# Patient Record
Sex: MALE | NOT HISPANIC OR LATINO | Employment: FULL TIME | URBAN - METROPOLITAN AREA
[De-identification: names, ages, dates, MRNs, and addresses within clinical notes are randomized per-mention and may not be internally consistent; named-entity substitution may affect disease eponyms.]

---

## 2018-12-03 ENCOUNTER — HOSPITAL ENCOUNTER (OUTPATIENT)
Dept: FAMILY MEDICINE CLINIC | Facility: CLINIC | Age: 48
Setting detail: SPECIMEN
Discharge: HOME | End: 2018-12-03
Attending: PHYSICIAN ASSISTANT | Admitting: PHYSICIAN ASSISTANT

## 2018-12-03 LAB
ALBUMIN SERPL-MCNC: 4.5 G/DL (ref 3.5–4.8)
ALBUMIN/GLOB SERPL: 1.7 {RATIO} (ref 1–1.7)
ALP SERPL-CCNC: 50 IU/L (ref 32–91)
ALT SERPL-CCNC: 32 IU/L (ref 17–63)
ANION GAP SERPL CALC-SCNC: 13.3 MMOL/L (ref 10–20)
AST SERPL-CCNC: 26 IU/L (ref 15–41)
BILIRUB SERPL-MCNC: 1 MG/DL (ref 0.3–1.2)
BUN SERPL-MCNC: 15 MG/DL (ref 8–20)
BUN/CREAT SERPL: 12.5 (ref 6.2–20.3)
CALCIUM SERPL-MCNC: 9.5 MG/DL (ref 8.9–10.3)
CHLORIDE SERPL-SCNC: 103 MMOL/L (ref 101–111)
CHOLEST SERPL-MCNC: 301 MG/DL
CHOLEST/HDLC SERPL: 6.9 {RATIO}
CONV CO2: 23 MMOL/L (ref 22–32)
CONV LDL CHOLESTEROL DIRECT: 239 MG/DL (ref 0–100)
CONV TOTAL PROTEIN: 7.1 G/DL (ref 6.1–7.9)
CREAT UR-MCNC: 1.2 MG/DL (ref 0.7–1.2)
GLOBULIN UR ELPH-MCNC: 2.6 G/DL (ref 2.5–3.8)
GLUCOSE SERPL-MCNC: 101 MG/DL (ref 65–99)
HDLC SERPL-MCNC: 44 MG/DL
LDLC/HDLC SERPL: 5.5 {RATIO}
LIPID INTERPRETATION: ABNORMAL
POTASSIUM SERPL-SCNC: 4.3 MMOL/L (ref 3.6–5.1)
PSA SERPL-MCNC: 0.75 NG/ML (ref 0–4)
SODIUM SERPL-SCNC: 135 MMOL/L (ref 136–144)
TRIGL SERPL-MCNC: 148 MG/DL
VLDLC SERPL CALC-MCNC: 18.8 MG/DL

## 2019-09-03 RX ORDER — ATORVASTATIN CALCIUM 20 MG/1
TABLET, FILM COATED ORAL
Qty: 90 TABLET | Refills: 1 | Status: SHIPPED | OUTPATIENT
Start: 2019-09-03 | End: 2020-03-06 | Stop reason: SDUPTHER

## 2019-10-08 RX ORDER — PANTOPRAZOLE SODIUM 40 MG/1
TABLET, DELAYED RELEASE ORAL
Qty: 90 TABLET | Refills: 3 | Status: SHIPPED | OUTPATIENT
Start: 2019-10-08 | End: 2020-06-04

## 2019-10-08 RX ORDER — FINASTERIDE 1 MG/1
TABLET, FILM COATED ORAL
Qty: 90 TABLET | Refills: 3 | Status: SHIPPED | OUTPATIENT
Start: 2019-10-08 | End: 2020-10-02 | Stop reason: SDUPTHER

## 2019-10-09 RX ORDER — ATENOLOL 25 MG/1
TABLET ORAL
Qty: 90 TABLET | Refills: 1 | Status: SHIPPED | OUTPATIENT
Start: 2019-10-09 | End: 2020-03-04

## 2020-03-04 RX ORDER — ATENOLOL 25 MG/1
TABLET ORAL
Qty: 90 TABLET | Refills: 1 | Status: SHIPPED | OUTPATIENT
Start: 2020-03-04 | End: 2020-08-31

## 2020-03-05 RX ORDER — ATORVASTATIN CALCIUM 20 MG/1
TABLET, FILM COATED ORAL
Qty: 90 TABLET | Refills: 1 | OUTPATIENT
Start: 2020-03-05

## 2020-03-06 RX ORDER — ATORVASTATIN CALCIUM 20 MG/1
20 TABLET, FILM COATED ORAL
Qty: 90 TABLET | Refills: 1 | Status: SHIPPED | OUTPATIENT
Start: 2020-03-06 | End: 2020-08-31

## 2020-06-08 ENCOUNTER — TELEPHONE (OUTPATIENT)
Dept: FAMILY MEDICINE CLINIC | Facility: CLINIC | Age: 50
End: 2020-06-08

## 2020-06-08 NOTE — TELEPHONE ENCOUNTER
I am not sending a 90-day supply because patient is long overdue for an appointment.  If he would like a refill he needs an appointment.

## 2020-06-25 ENCOUNTER — OFFICE VISIT (OUTPATIENT)
Dept: FAMILY MEDICINE CLINIC | Facility: CLINIC | Age: 50
End: 2020-06-25

## 2020-06-25 ENCOUNTER — LAB (OUTPATIENT)
Dept: FAMILY MEDICINE CLINIC | Facility: CLINIC | Age: 50
End: 2020-06-25

## 2020-06-25 VITALS
SYSTOLIC BLOOD PRESSURE: 152 MMHG | WEIGHT: 146 LBS | HEART RATE: 97 BPM | DIASTOLIC BLOOD PRESSURE: 83 MMHG | BODY MASS INDEX: 23.57 KG/M2 | TEMPERATURE: 97.6 F | OXYGEN SATURATION: 100 %

## 2020-06-25 DIAGNOSIS — L64.9 MALE PATTERN BALDNESS: ICD-10-CM

## 2020-06-25 DIAGNOSIS — R53.82 CHRONIC FATIGUE: ICD-10-CM

## 2020-06-25 DIAGNOSIS — I10 ESSENTIAL HYPERTENSION: ICD-10-CM

## 2020-06-25 DIAGNOSIS — R00.2 PALPITATIONS: ICD-10-CM

## 2020-06-25 DIAGNOSIS — K21.9 GASTROESOPHAGEAL REFLUX DISEASE WITHOUT ESOPHAGITIS: ICD-10-CM

## 2020-06-25 DIAGNOSIS — E78.2 MIXED HYPERLIPIDEMIA: ICD-10-CM

## 2020-06-25 DIAGNOSIS — Z11.59 ENCOUNTER FOR HEPATITIS C SCREENING TEST FOR LOW RISK PATIENT: ICD-10-CM

## 2020-06-25 DIAGNOSIS — R00.0 TACHYCARDIA: ICD-10-CM

## 2020-06-25 DIAGNOSIS — F41.9 ANXIETY: ICD-10-CM

## 2020-06-25 DIAGNOSIS — Z00.00 PHYSICAL EXAM: Primary | ICD-10-CM

## 2020-06-25 DIAGNOSIS — E55.9 VITAMIN D DEFICIENCY: ICD-10-CM

## 2020-06-25 PROBLEM — E78.5 HYPERLIPIDEMIA: Status: ACTIVE | Noted: 2018-12-05

## 2020-06-25 PROCEDURE — 80053 COMPREHEN METABOLIC PANEL: CPT | Performed by: PHYSICIAN ASSISTANT

## 2020-06-25 PROCEDURE — 86803 HEPATITIS C AB TEST: CPT | Performed by: PHYSICIAN ASSISTANT

## 2020-06-25 PROCEDURE — 99396 PREV VISIT EST AGE 40-64: CPT | Performed by: PHYSICIAN ASSISTANT

## 2020-06-25 PROCEDURE — 83735 ASSAY OF MAGNESIUM: CPT | Performed by: PHYSICIAN ASSISTANT

## 2020-06-25 PROCEDURE — 80061 LIPID PANEL: CPT | Performed by: PHYSICIAN ASSISTANT

## 2020-06-25 PROCEDURE — 82607 VITAMIN B-12: CPT | Performed by: PHYSICIAN ASSISTANT

## 2020-06-25 PROCEDURE — 82306 VITAMIN D 25 HYDROXY: CPT | Performed by: PHYSICIAN ASSISTANT

## 2020-06-25 PROCEDURE — 36415 COLL VENOUS BLD VENIPUNCTURE: CPT | Performed by: PHYSICIAN ASSISTANT

## 2020-06-25 RX ORDER — PANTOPRAZOLE SODIUM 40 MG/1
40 TABLET, DELAYED RELEASE ORAL EVERY 24 HOURS
COMMUNITY
Start: 2018-09-11 | End: 2020-07-17 | Stop reason: SDUPTHER

## 2020-06-25 RX ORDER — FEXOFENADINE HCL AND PSEUDOEPHEDRINE HCI 180; 240 MG/1; MG/1
TABLET, EXTENDED RELEASE ORAL DAILY PRN
COMMUNITY
Start: 2014-08-19 | End: 2020-06-25

## 2020-06-25 NOTE — PROGRESS NOTES
Melia Aguirre is a 49 y.o. male     History of Present Illness  Patient is a 49-year-old  male here for follow-up and maintenance of his current medical conditions which include:    1.  Hypertension: Patient is currently taking atenolol 25 mg once daily.    2.  GERD: Patient is currently taking pantoprazole 40 mg once daily.    3.  Hyperlipidemia: Patient is currently taking atorvastatin 20 mg once nightly.    4.  Male pattern baldness: Patient is currently taking finasteride 1 mg once daily.    5.  Palpitations/tachycardia: Patient is currently taking atenolol 25 mg once daily.  His cardiologist is Dr. Khalil.    6.  Anxiety: Patient is currently not taking medication for this, he is managing with lifestyle.    7.  Vitamin D deficiency: Patient is currently not taking medication for this, he states he takes over-the-counter medication occasionally.    The following portions of the patient's history were reviewed and updated as appropriate: allergies, current medications, past family history, past medical history, past social history, past surgical history and problem list.    Review of Systems   Constitutional: Negative for fever and unexpected weight loss.   HENT: Negative for ear pain and sore throat.    Eyes: Negative for blurred vision and pain.   Respiratory: Negative for shortness of breath.    Cardiovascular: Negative for chest pain.   Gastrointestinal: Negative for abdominal pain, blood in stool, diarrhea, nausea and vomiting.   Genitourinary: Negative for dysuria.   Musculoskeletal: Negative for joint swelling.   Neurological: Negative for seizures and confusion.   Psychiatric/Behavioral: Negative for suicidal ideas and depressed mood. The patient is not nervous/anxious.        Objective  Physical Exam   Constitutional: He is oriented to person, place, and time. He appears well-developed and well-nourished.   HENT:   Head: Normocephalic and atraumatic.   Right Ear: External ear normal.    Left Ear: External ear normal.   Nose: Nose normal.   Mouth/Throat: Oropharynx is clear and moist.   Eyes: Pupils are equal, round, and reactive to light. Conjunctivae and EOM are normal.   Neck: Normal range of motion. Neck supple.   Cardiovascular: Normal rate, regular rhythm and normal heart sounds.   Pulmonary/Chest: Effort normal and breath sounds normal.   Abdominal: Soft. Bowel sounds are normal.   Musculoskeletal: Normal range of motion.   Neurological: He is alert and oriented to person, place, and time.   Psychiatric: He has a normal mood and affect. His behavior is normal.       Vitals:    06/25/20 1511   BP: 152/83   BP Location: Right arm   Patient Position: Sitting   Cuff Size: Adult   Pulse: 97   Temp: 97.6 °F (36.4 °C)   TempSrc: Oral   SpO2: 100%   Weight: 66.2 kg (146 lb)     Body mass index is 23.57 kg/m².    PHQ-9 Total Score: 0      Assessment/Plan  Diagnoses and all orders for this visit:    1. Physical exam (Primary)    2. Mixed hyperlipidemia  Comments:  Stable, patient to continue current medications.  Orders:  -     Lipid Panel    3. Gastroesophageal reflux disease without esophagitis  Comments:  Stable, patient to continue current medications.    4. Tachycardia  Comments:  Stable, patient to continue current medications.    5. Palpitations  Comments:  Stable, patient to continue current medications.    6. Essential hypertension  Comments:  Stable, patient to continue current medications.  He is to check blood pressures at home and call if consistently elevated.  Orders:  -     Comprehensive Metabolic Panel  -     Magnesium    7. Vitamin D deficiency  Comments:  Vitamin D level today.  Orders:  -     Vitamin D 25 Hydroxy    8. Male pattern baldness  Comments:  Stable, patient to continue current medications.    9. Anxiety  Comments:  Stable, patient is doing well with lifestyle.    10. Encounter for hepatitis C screening test for low risk patient  Comments:  Hepatitis C screening due to  patient's age.  Orders:  -     Hepatitis C Antibody    11. Chronic fatigue  Comments:  Vitamin B12 level and vitamin D levels today at patient request.  Orders:  -     Vitamin B12

## 2020-06-26 LAB
25(OH)D3 SERPL-MCNC: 32.7 NG/ML (ref 30–100)
ALBUMIN SERPL-MCNC: 5.1 G/DL (ref 3.5–5.2)
ALBUMIN/GLOB SERPL: 1.8 G/DL
ALP SERPL-CCNC: 57 U/L (ref 39–117)
ALT SERPL W P-5'-P-CCNC: 21 U/L (ref 1–41)
ANION GAP SERPL CALCULATED.3IONS-SCNC: 12.1 MMOL/L (ref 5–15)
AST SERPL-CCNC: 19 U/L (ref 1–40)
BILIRUB SERPL-MCNC: 0.8 MG/DL (ref 0.2–1.2)
BUN BLD-MCNC: 18 MG/DL (ref 6–20)
BUN/CREAT SERPL: 15 (ref 7–25)
CALCIUM SPEC-SCNC: 10.2 MG/DL (ref 8.6–10.5)
CHLORIDE SERPL-SCNC: 101 MMOL/L (ref 98–107)
CHOLEST SERPL-MCNC: 149 MG/DL (ref 0–200)
CO2 SERPL-SCNC: 25.9 MMOL/L (ref 22–29)
CREAT BLD-MCNC: 1.2 MG/DL (ref 0.76–1.27)
GFR SERPL CREATININE-BSD FRML MDRD: 65 ML/MIN/1.73
GFR SERPL CREATININE-BSD FRML MDRD: 79 ML/MIN/1.73
GLOBULIN UR ELPH-MCNC: 2.8 GM/DL
GLUCOSE BLD-MCNC: 113 MG/DL (ref 65–99)
HCV AB SER DONR QL: NORMAL
HDLC SERPL-MCNC: 47 MG/DL (ref 40–60)
LDLC SERPL CALC-MCNC: 79 MG/DL (ref 0–100)
LDLC/HDLC SERPL: 1.68 {RATIO}
MAGNESIUM SERPL-MCNC: 2.3 MG/DL (ref 1.6–2.6)
POTASSIUM BLD-SCNC: 4.6 MMOL/L (ref 3.5–5.2)
PROT SERPL-MCNC: 7.9 G/DL (ref 6–8.5)
SODIUM BLD-SCNC: 139 MMOL/L (ref 136–145)
TRIGL SERPL-MCNC: 115 MG/DL (ref 0–150)
VIT B12 BLD-MCNC: 990 PG/ML (ref 211–946)
VLDLC SERPL-MCNC: 23 MG/DL (ref 5–40)

## 2020-07-17 RX ORDER — PANTOPRAZOLE SODIUM 40 MG/1
40 TABLET, DELAYED RELEASE ORAL EVERY 24 HOURS
Qty: 90 TABLET | Refills: 3 | Status: SHIPPED | OUTPATIENT
Start: 2020-07-17 | End: 2020-10-01

## 2020-07-21 RX ORDER — PANTOPRAZOLE SODIUM 40 MG/1
TABLET, DELAYED RELEASE ORAL
Qty: 90 TABLET | Refills: 3 | OUTPATIENT
Start: 2020-07-21

## 2020-08-31 DIAGNOSIS — E78.2 MIXED HYPERLIPIDEMIA: Primary | ICD-10-CM

## 2020-08-31 RX ORDER — ATENOLOL 25 MG/1
TABLET ORAL
Qty: 90 TABLET | Refills: 1 | Status: SHIPPED | OUTPATIENT
Start: 2020-08-31 | End: 2021-06-02 | Stop reason: SDUPTHER

## 2020-08-31 RX ORDER — ATORVASTATIN CALCIUM 20 MG/1
20 TABLET, FILM COATED ORAL NIGHTLY
Qty: 90 TABLET | Refills: 1 | Status: SHIPPED | OUTPATIENT
Start: 2020-08-31 | End: 2021-02-25

## 2020-08-31 NOTE — TELEPHONE ENCOUNTER
Lab Results   Component Value Date    CHOL 149 06/25/2020    TRIG 115 06/25/2020    HDL 47 06/25/2020    LDL 79 06/25/2020     The 10-year ASCVD risk score (William ESQUIVEL Jr., et al., 2013) is: 2.7%    Values used to calculate the score:      Age: 49 years      Sex: Male      Is Non- : No      Diabetic: No      Tobacco smoker: No      Systolic Blood Pressure: 152 mmHg      Is BP treated: Yes      HDL Cholesterol: 47 mg/dL      Total Cholesterol: 149 mg/dL

## 2020-10-01 RX ORDER — PANTOPRAZOLE SODIUM 40 MG/1
TABLET, DELAYED RELEASE ORAL
Qty: 90 TABLET | Refills: 0 | Status: SHIPPED | OUTPATIENT
Start: 2020-10-01 | End: 2020-12-23

## 2020-10-04 RX ORDER — FINASTERIDE 1 MG/1
1 TABLET, FILM COATED ORAL DAILY
Qty: 90 TABLET | Refills: 0 | Status: SHIPPED | OUTPATIENT
Start: 2020-10-04 | End: 2020-12-31

## 2020-12-23 RX ORDER — PANTOPRAZOLE SODIUM 40 MG/1
TABLET, DELAYED RELEASE ORAL
Qty: 90 TABLET | Refills: 0 | Status: SHIPPED | OUTPATIENT
Start: 2020-12-23 | End: 2021-02-26

## 2020-12-31 RX ORDER — FINASTERIDE 1 MG/1
TABLET, FILM COATED ORAL
Qty: 90 TABLET | Refills: 0 | Status: SHIPPED | OUTPATIENT
Start: 2020-12-31 | End: 2021-02-26

## 2021-02-25 DIAGNOSIS — E78.2 MIXED HYPERLIPIDEMIA: ICD-10-CM

## 2021-02-25 RX ORDER — ATORVASTATIN CALCIUM 20 MG/1
TABLET, FILM COATED ORAL
Qty: 90 TABLET | Refills: 1 | Status: SHIPPED | OUTPATIENT
Start: 2021-02-25 | End: 2021-08-23

## 2021-02-25 RX ORDER — ATENOLOL 25 MG/1
25 TABLET ORAL DAILY
Qty: 30 TABLET | Refills: 0 | OUTPATIENT
Start: 2021-02-25

## 2021-02-26 RX ORDER — FINASTERIDE 1 MG/1
TABLET, FILM COATED ORAL
Qty: 90 TABLET | Refills: 0 | Status: SHIPPED | OUTPATIENT
Start: 2021-02-26 | End: 2021-05-23

## 2021-02-26 RX ORDER — ATENOLOL 25 MG/1
TABLET ORAL
Qty: 90 TABLET | Refills: 1 | OUTPATIENT
Start: 2021-02-26

## 2021-02-26 RX ORDER — PANTOPRAZOLE SODIUM 40 MG/1
TABLET, DELAYED RELEASE ORAL
Qty: 90 TABLET | Refills: 0 | Status: SHIPPED | OUTPATIENT
Start: 2021-02-26 | End: 2021-05-23

## 2021-04-29 ENCOUNTER — TELEPHONE (OUTPATIENT)
Dept: CARDIOLOGY | Facility: CLINIC | Age: 51
End: 2021-04-29

## 2021-05-11 ENCOUNTER — OFFICE VISIT (OUTPATIENT)
Dept: CARDIOLOGY | Facility: CLINIC | Age: 51
End: 2021-05-11

## 2021-05-11 VITALS
OXYGEN SATURATION: 99 % | HEART RATE: 104 BPM | HEIGHT: 64 IN | DIASTOLIC BLOOD PRESSURE: 90 MMHG | BODY MASS INDEX: 24.41 KG/M2 | WEIGHT: 143 LBS | SYSTOLIC BLOOD PRESSURE: 150 MMHG

## 2021-05-11 DIAGNOSIS — R00.0 TACHYCARDIA: ICD-10-CM

## 2021-05-11 DIAGNOSIS — R00.2 PALPITATIONS: ICD-10-CM

## 2021-05-11 DIAGNOSIS — I10 ESSENTIAL HYPERTENSION: Primary | ICD-10-CM

## 2021-05-11 PROBLEM — E78.2 MIXED HYPERLIPIDEMIA: Status: ACTIVE | Noted: 2018-12-05

## 2021-05-11 PROCEDURE — 99213 OFFICE O/P EST LOW 20 MIN: CPT | Performed by: INTERNAL MEDICINE

## 2021-05-11 PROCEDURE — 93000 ELECTROCARDIOGRAM COMPLETE: CPT | Performed by: INTERNAL MEDICINE

## 2021-05-11 RX ORDER — FEXOFENADINE HYDROCHLORIDE 60 MG/1
60 TABLET, FILM COATED ORAL DAILY
COMMUNITY

## 2021-05-23 RX ORDER — FINASTERIDE 1 MG/1
TABLET, FILM COATED ORAL
Qty: 30 TABLET | Refills: 0 | Status: SHIPPED | OUTPATIENT
Start: 2021-05-23 | End: 2021-07-16

## 2021-05-23 RX ORDER — PANTOPRAZOLE SODIUM 40 MG/1
TABLET, DELAYED RELEASE ORAL
Qty: 30 TABLET | Refills: 0 | Status: SHIPPED | OUTPATIENT
Start: 2021-05-23 | End: 2021-06-22

## 2021-05-23 NOTE — TELEPHONE ENCOUNTER
I refilled his rx but he needs to make an appt and be seen.  It has been a year since he was last seen

## 2021-05-27 ENCOUNTER — TELEPHONE (OUTPATIENT)
Dept: FAMILY MEDICINE CLINIC | Facility: CLINIC | Age: 51
End: 2021-05-27

## 2021-06-02 RX ORDER — ATENOLOL 25 MG/1
25 TABLET ORAL DAILY
Qty: 90 TABLET | Refills: 3 | Status: SHIPPED | OUTPATIENT
Start: 2021-06-02 | End: 2022-05-31 | Stop reason: SDUPTHER

## 2021-06-02 NOTE — TELEPHONE ENCOUNTER
Called patient back and was able to schedule an appointment for Annie 15,2021 at 3:30 with [place] 30 min since it was one of [place] patients

## 2021-06-22 RX ORDER — PANTOPRAZOLE SODIUM 40 MG/1
TABLET, DELAYED RELEASE ORAL
Qty: 30 TABLET | Refills: 0 | Status: SHIPPED | OUTPATIENT
Start: 2021-06-22 | End: 2021-07-16

## 2021-07-16 RX ORDER — PANTOPRAZOLE SODIUM 40 MG/1
TABLET, DELAYED RELEASE ORAL
Qty: 30 TABLET | Refills: 0 | Status: SHIPPED | OUTPATIENT
Start: 2021-07-16 | End: 2021-08-10

## 2021-07-16 RX ORDER — FINASTERIDE 1 MG/1
TABLET, FILM COATED ORAL
Qty: 30 TABLET | Refills: 0 | Status: SHIPPED | OUTPATIENT
Start: 2021-07-16 | End: 2021-08-10

## 2021-07-19 ENCOUNTER — OFFICE VISIT (OUTPATIENT)
Dept: FAMILY MEDICINE CLINIC | Facility: CLINIC | Age: 51
End: 2021-07-19

## 2021-07-19 VITALS
TEMPERATURE: 97.1 F | OXYGEN SATURATION: 100 % | SYSTOLIC BLOOD PRESSURE: 143 MMHG | DIASTOLIC BLOOD PRESSURE: 92 MMHG | WEIGHT: 142 LBS | BODY MASS INDEX: 24.24 KG/M2 | HEART RATE: 108 BPM | HEIGHT: 64 IN

## 2021-07-19 DIAGNOSIS — Z00.00 ROUTINE PHYSICAL EXAMINATION: Primary | ICD-10-CM

## 2021-07-19 DIAGNOSIS — F41.9 ANXIETY: ICD-10-CM

## 2021-07-19 DIAGNOSIS — Z13.220 SCREENING CHOLESTEROL LEVEL: ICD-10-CM

## 2021-07-19 DIAGNOSIS — E55.9 VITAMIN D DEFICIENCY: ICD-10-CM

## 2021-07-19 DIAGNOSIS — R10.11 RIGHT UPPER QUADRANT ABDOMINAL PAIN: ICD-10-CM

## 2021-07-19 DIAGNOSIS — Z13.1 SCREENING FOR DIABETES MELLITUS: ICD-10-CM

## 2021-07-19 PROCEDURE — 99396 PREV VISIT EST AGE 40-64: CPT | Performed by: PHYSICIAN ASSISTANT

## 2021-07-19 NOTE — PATIENT INSTRUCTIONS
Please get your fasting labs done at your convenience.  Let me know if you decide you want to do the colonoscopy or cologuard test for colon cancer screening.

## 2021-08-04 ENCOUNTER — LAB (OUTPATIENT)
Dept: FAMILY MEDICINE CLINIC | Facility: CLINIC | Age: 51
End: 2021-08-04

## 2021-08-04 DIAGNOSIS — Z13.1 SCREENING FOR DIABETES MELLITUS: ICD-10-CM

## 2021-08-04 DIAGNOSIS — Z00.00 ROUTINE PHYSICAL EXAMINATION: ICD-10-CM

## 2021-08-04 DIAGNOSIS — E55.9 VITAMIN D DEFICIENCY: ICD-10-CM

## 2021-08-04 DIAGNOSIS — Z13.220 SCREENING CHOLESTEROL LEVEL: ICD-10-CM

## 2021-08-04 DIAGNOSIS — F41.9 ANXIETY: ICD-10-CM

## 2021-08-04 PROCEDURE — 80053 COMPREHEN METABOLIC PANEL: CPT | Performed by: PHYSICIAN ASSISTANT

## 2021-08-04 PROCEDURE — 82306 VITAMIN D 25 HYDROXY: CPT | Performed by: PHYSICIAN ASSISTANT

## 2021-08-04 PROCEDURE — 80061 LIPID PANEL: CPT | Performed by: PHYSICIAN ASSISTANT

## 2021-08-04 PROCEDURE — 85025 COMPLETE CBC W/AUTO DIFF WBC: CPT | Performed by: PHYSICIAN ASSISTANT

## 2021-08-04 PROCEDURE — 36415 COLL VENOUS BLD VENIPUNCTURE: CPT

## 2021-08-04 PROCEDURE — 84443 ASSAY THYROID STIM HORMONE: CPT | Performed by: PHYSICIAN ASSISTANT

## 2021-08-05 LAB
25(OH)D3 SERPL-MCNC: 38.2 NG/ML (ref 30–100)
ALBUMIN SERPL-MCNC: 4.8 G/DL (ref 3.5–5.2)
ALBUMIN/GLOB SERPL: 2.2 G/DL
ALP SERPL-CCNC: 48 U/L (ref 39–117)
ALT SERPL W P-5'-P-CCNC: 22 U/L (ref 1–41)
ANION GAP SERPL CALCULATED.3IONS-SCNC: 12.3 MMOL/L (ref 5–15)
AST SERPL-CCNC: 19 U/L (ref 1–40)
BASOPHILS # BLD AUTO: 0.09 10*3/MM3 (ref 0–0.2)
BASOPHILS NFR BLD AUTO: 1.2 % (ref 0–1.5)
BILIRUB SERPL-MCNC: 1.1 MG/DL (ref 0–1.2)
BUN SERPL-MCNC: 14 MG/DL (ref 6–20)
BUN/CREAT SERPL: 12.3 (ref 7–25)
CALCIUM SPEC-SCNC: 9.3 MG/DL (ref 8.6–10.5)
CHLORIDE SERPL-SCNC: 102 MMOL/L (ref 98–107)
CHOLEST SERPL-MCNC: 147 MG/DL (ref 0–200)
CO2 SERPL-SCNC: 24.7 MMOL/L (ref 22–29)
CREAT SERPL-MCNC: 1.14 MG/DL (ref 0.76–1.27)
DEPRECATED RDW RBC AUTO: 37.7 FL (ref 37–54)
EOSINOPHIL # BLD AUTO: 0.52 10*3/MM3 (ref 0–0.4)
EOSINOPHIL NFR BLD AUTO: 6.7 % (ref 0.3–6.2)
ERYTHROCYTE [DISTWIDTH] IN BLOOD BY AUTOMATED COUNT: 12.1 % (ref 12.3–15.4)
GFR SERPL CREATININE-BSD FRML MDRD: 69 ML/MIN/1.73
GFR SERPL CREATININE-BSD FRML MDRD: 83 ML/MIN/1.73
GLOBULIN UR ELPH-MCNC: 2.2 GM/DL
GLUCOSE SERPL-MCNC: 93 MG/DL (ref 65–99)
HCT VFR BLD AUTO: 44.6 % (ref 37.5–51)
HDLC SERPL-MCNC: 46 MG/DL (ref 40–60)
HGB BLD-MCNC: 15 G/DL (ref 13–17.7)
IMM GRANULOCYTES # BLD AUTO: 0.01 10*3/MM3 (ref 0–0.05)
IMM GRANULOCYTES NFR BLD AUTO: 0.1 % (ref 0–0.5)
LDLC SERPL CALC-MCNC: 82 MG/DL (ref 0–100)
LDLC/HDLC SERPL: 1.75 {RATIO}
LYMPHOCYTES # BLD AUTO: 2.49 10*3/MM3 (ref 0.7–3.1)
LYMPHOCYTES NFR BLD AUTO: 32 % (ref 19.6–45.3)
MCH RBC QN AUTO: 29.1 PG (ref 26.6–33)
MCHC RBC AUTO-ENTMCNC: 33.6 G/DL (ref 31.5–35.7)
MCV RBC AUTO: 86.4 FL (ref 79–97)
MONOCYTES # BLD AUTO: 0.64 10*3/MM3 (ref 0.1–0.9)
MONOCYTES NFR BLD AUTO: 8.2 % (ref 5–12)
NEUTROPHILS NFR BLD AUTO: 4.04 10*3/MM3 (ref 1.7–7)
NEUTROPHILS NFR BLD AUTO: 51.8 % (ref 42.7–76)
NRBC BLD AUTO-RTO: 0 /100 WBC (ref 0–0.2)
PLATELET # BLD AUTO: 265 10*3/MM3 (ref 140–450)
PMV BLD AUTO: 10.5 FL (ref 6–12)
POTASSIUM SERPL-SCNC: 4.2 MMOL/L (ref 3.5–5.2)
PROT SERPL-MCNC: 7 G/DL (ref 6–8.5)
RBC # BLD AUTO: 5.16 10*6/MM3 (ref 4.14–5.8)
SODIUM SERPL-SCNC: 139 MMOL/L (ref 136–145)
TRIGL SERPL-MCNC: 102 MG/DL (ref 0–150)
TSH SERPL DL<=0.05 MIU/L-ACNC: 3.64 UIU/ML (ref 0.27–4.2)
VLDLC SERPL-MCNC: 19 MG/DL (ref 5–40)
WBC # BLD AUTO: 7.79 10*3/MM3 (ref 3.4–10.8)

## 2021-08-06 ENCOUNTER — HOSPITAL ENCOUNTER (OUTPATIENT)
Dept: ULTRASOUND IMAGING | Facility: HOSPITAL | Age: 51
Discharge: HOME | End: 2021-08-06
Admitting: PHYSICIAN ASSISTANT

## 2021-08-06 ENCOUNTER — HOSPITAL ENCOUNTER (OUTPATIENT)
Dept: NUCLEAR MEDICINE | Facility: HOSPITAL | Age: 51
Discharge: HOME | End: 2021-08-06

## 2021-08-06 DIAGNOSIS — R10.11 RIGHT UPPER QUADRANT ABDOMINAL PAIN: ICD-10-CM

## 2021-08-06 PROCEDURE — 0 TECHNETIUM TC 99M MEBROFENIN KIT: Performed by: PHYSICIAN ASSISTANT

## 2021-08-06 PROCEDURE — A9537 TC99M MEBROFENIN: HCPCS | Performed by: PHYSICIAN ASSISTANT

## 2021-08-06 PROCEDURE — 76705 ECHO EXAM OF ABDOMEN: CPT

## 2021-08-06 PROCEDURE — 78227 HEPATOBIL SYST IMAGE W/DRUG: CPT

## 2021-08-06 RX ORDER — KIT FOR THE PREPARATION OF TECHNETIUM TC 99M MEBROFENIN 45 MG/10ML
1 INJECTION, POWDER, LYOPHILIZED, FOR SOLUTION INTRAVENOUS
Status: COMPLETED | OUTPATIENT
Start: 2021-08-06 | End: 2021-08-06

## 2021-08-06 RX ADMIN — MEBROFENIN 1 DOSE: 45 INJECTION, POWDER, LYOPHILIZED, FOR SOLUTION INTRAVENOUS at 11:12

## 2021-08-10 ENCOUNTER — TELEPHONE (OUTPATIENT)
Dept: FAMILY MEDICINE CLINIC | Facility: CLINIC | Age: 51
End: 2021-08-10

## 2021-08-10 RX ORDER — PANTOPRAZOLE SODIUM 40 MG/1
TABLET, DELAYED RELEASE ORAL
Qty: 30 TABLET | Refills: 0 | Status: SHIPPED | OUTPATIENT
Start: 2021-08-10 | End: 2021-10-18

## 2021-08-10 RX ORDER — FINASTERIDE 1 MG/1
TABLET, FILM COATED ORAL
Qty: 30 TABLET | Refills: 0 | Status: SHIPPED | OUTPATIENT
Start: 2021-08-10 | End: 2021-09-08 | Stop reason: SDUPTHER

## 2021-08-22 DIAGNOSIS — E78.2 MIXED HYPERLIPIDEMIA: ICD-10-CM

## 2021-08-23 RX ORDER — ATORVASTATIN CALCIUM 20 MG/1
TABLET, FILM COATED ORAL
Qty: 90 TABLET | Refills: 1 | Status: SHIPPED | OUTPATIENT
Start: 2021-08-23 | End: 2022-02-22

## 2021-09-08 RX ORDER — FINASTERIDE 1 MG/1
TABLET, FILM COATED ORAL
Qty: 30 TABLET | Refills: 11 | Status: SHIPPED | OUTPATIENT
Start: 2021-09-08 | End: 2022-09-07 | Stop reason: SDUPTHER

## 2021-10-18 RX ORDER — PANTOPRAZOLE SODIUM 40 MG/1
TABLET, DELAYED RELEASE ORAL
Qty: 30 TABLET | Refills: 0 | Status: SHIPPED | OUTPATIENT
Start: 2021-10-18 | End: 2021-11-14

## 2021-11-08 ENCOUNTER — OFFICE VISIT (OUTPATIENT)
Dept: FAMILY MEDICINE CLINIC | Facility: CLINIC | Age: 51
End: 2021-11-08

## 2021-11-08 VITALS
TEMPERATURE: 97.8 F | HEIGHT: 64 IN | OXYGEN SATURATION: 99 % | WEIGHT: 146 LBS | DIASTOLIC BLOOD PRESSURE: 85 MMHG | SYSTOLIC BLOOD PRESSURE: 135 MMHG | HEART RATE: 94 BPM | BODY MASS INDEX: 24.92 KG/M2 | RESPIRATION RATE: 16 BRPM

## 2021-11-08 DIAGNOSIS — G57.01 PIRIFORMIS SYNDROME OF RIGHT SIDE: Primary | ICD-10-CM

## 2021-11-08 DIAGNOSIS — K21.9 GASTROESOPHAGEAL REFLUX DISEASE WITHOUT ESOPHAGITIS: ICD-10-CM

## 2021-11-08 PROCEDURE — 99213 OFFICE O/P EST LOW 20 MIN: CPT | Performed by: PHYSICIAN ASSISTANT

## 2021-11-08 RX ORDER — TIZANIDINE HYDROCHLORIDE 4 MG/1
4 CAPSULE, GELATIN COATED ORAL 3 TIMES DAILY PRN
Qty: 30 CAPSULE | Refills: 2 | Status: SHIPPED | OUTPATIENT
Start: 2021-11-08 | End: 2022-06-27

## 2021-11-08 NOTE — PROGRESS NOTES
Melia Aguirre is a 50 y.o. male.     Pt presents with right lower back pain in upper part of right buttock.  A couple of weeks ago, he went on a road trip to [place] and then was hiking when he started having terrible pain in right upper buttock. No bowel or bladder. No numbness/tingling. No changes in weight. He has had this same pain occur over the years intermittently for the past 15 years.  He states the pain is now improved since it started a couple of weeks ago.  He has gone on some hikes since then and has had no issues.    He also had workup for his gallbladder a couple of months ago but everything was normal.  He continues to occasionally have symptoms consistent with heartburn/indigestion.  He does drink large amount of coffee and loves chocolate.  He continues to take pantoprazole which is helpful.  Will wait on further workup at this time but if it becomes worse or more frequent, will need to send for EGD.       The following portions of the patient's history were reviewed and updated as appropriate: allergies, current medications, past family history, past medical history, past social history, past surgical history and problem list.  Past Medical History:   Diagnosis Date   • Allergic    • Allergic rhinitis    • Anxiety    • BPPV (benign paroxysmal positional vertigo)    • Essential hypertension    • GERD (gastroesophageal reflux disease)    • Hyperlipidemia    • Palpitations    • Tachycardia    • Vitamin D deficiency      Past Surgical History:   Procedure Laterality Date   • FRACTURE SURGERY       Family History   Problem Relation Age of Onset   • Arthritis Mother    • Hearing loss Mother    • Hyperlipidemia Mother    • Hypertension Mother    • Hyperlipidemia Father    • Diabetes Maternal Uncle    • Diabetes Paternal Uncle    • Diabetes Maternal Grandmother    • Diabetes Paternal Grandmother    • Heart disease Paternal Grandfather      Social History     Socioeconomic History   • Marital  "status: Single   Tobacco Use   • Smoking status: Never Smoker   • Smokeless tobacco: Never Used   Substance and Sexual Activity   • Alcohol use: Not Currently   • Drug use: Not Currently   • Sexual activity: Yes     Partners: Female         Current Outpatient Medications:   •  atenolol (TENORMIN) 25 MG tablet, Take 1 tablet by mouth Daily., Disp: 90 tablet, Rfl: 3  •  atorvastatin (LIPITOR) 20 MG tablet, TAKE 1 TABLET BY MOUTH EVERY DAY AT NIGHT, Disp: 90 tablet, Rfl: 1  •  fexofenadine (ALLEGRA) 60 MG tablet, Take 60 mg by mouth Daily., Disp: , Rfl:   •  finasteride (PROPECIA) 1 MG tablet, TAKE 1 TABLET BY MOUTH EVERY DAY, Disp: 30 tablet, Rfl: 11  •  pantoprazole (PROTONIX) 40 MG EC tablet, TAKE 1 TABLET BY MOUTH EVERY DAY, Disp: 30 tablet, Rfl: 0  •  TiZANidine (ZANAFLEX) 4 MG capsule, Take 1 capsule by mouth 3 (Three) Times a Day As Needed for Muscle Spasms., Disp: 30 capsule, Rfl: 2    Review of Systems   Constitutional: Negative for activity change, appetite change, chills, diaphoresis, fatigue, fever, unexpected weight gain and unexpected weight loss.   Eyes: Negative for blurred vision and visual disturbance.   Respiratory: Negative for chest tightness and shortness of breath.    Cardiovascular: Negative for chest pain, palpitations and leg swelling.   Gastrointestinal: Positive for indigestion. Negative for abdominal distention, abdominal pain, anal bleeding, blood in stool, constipation, diarrhea, nausea, vomiting and GERD.   Musculoskeletal: Positive for back pain. Negative for gait problem.   Neurological: Negative for dizziness, weakness, light-headedness, numbness, headache and confusion.   Psychiatric/Behavioral: The patient is nervous/anxious.      /85 (BP Location: Left arm, Patient Position: Sitting, Cuff Size: Adult)   Pulse 94   Temp 97.8 °F (36.6 °C) (Temporal)   Resp 16   Ht 162.6 cm (64\")   Wt 66.2 kg (146 lb)   SpO2 99%   BMI 25.06 kg/m²       Objective   Physical Exam  Vitals " and nursing note reviewed.   Constitutional:       Appearance: Normal appearance.   Neck:      Vascular: No carotid bruit.   Cardiovascular:      Rate and Rhythm: Normal rate and regular rhythm.      Heart sounds: Normal heart sounds.   Pulmonary:      Effort: Pulmonary effort is normal.      Breath sounds: Normal breath sounds.   Abdominal:      General: Abdomen is flat. Bowel sounds are normal.      Palpations: Abdomen is soft.   Musculoskeletal:         General: No tenderness.      Cervical back: Normal, normal range of motion and neck supple.      Thoracic back: Normal.      Lumbar back: Decreased range of motion. Negative right straight leg raise test and negative left straight leg raise test.      Right lower leg: No edema.      Left lower leg: No edema.   Skin:     General: Skin is warm and dry.   Neurological:      General: No focal deficit present.      Mental Status: He is alert and oriented to person, place, and time.   Psychiatric:         Mood and Affect: Mood normal.         Behavior: Behavior normal.         Thought Content: Thought content normal.         Procedures     Assessment/Plan   Diagnoses and all orders for this visit:    1. Piriformis syndrome of right side (Primary)  Comments:  Pt to work on stretches and exercises as given today and take ibuprofen with food along with muscle relaxer if needed.  If not improving, will send to PT.  Orders:  -     TiZANidine (ZANAFLEX) 4 MG capsule; Take 1 capsule by mouth 3 (Three) Times a Day As Needed for Muscle Spasms.  Dispense: 30 capsule; Refill: 2    2. Gastroesophageal reflux disease without esophagitis  Comments:  Pt to continue pantoprazole and monitor symptoms. Symptoms stable. Make sure to not have coffee on empty stomach.  If symptoms worsen, will send to GI for EGD.

## 2021-11-08 NOTE — PATIENT INSTRUCTIONS
Piriformis Syndrome Rehab  Ask your health care provider which exercises are safe for you. Do exercises exactly as told by your health care provider and adjust them as directed. It is normal to feel mild stretching, pulling, tightness, or discomfort as you do these exercises. Stop right away if you feel sudden pain or your pain gets worse. Do not begin these exercises until told by your health care provider.  Stretching and range-of-motion exercises  These exercises warm up your muscles and joints and improve the movement and flexibility of your hip and pelvis. The exercises also help to relieve pain, numbness, and tingling.  Hip rotation  This is an exercise in which you lie on your back and stretch the muscles that rotate your hip (hip rotators) to stretch your buttocks.  1. Lie on your back on a firm surface.  2. Pull your left / right knee toward your same shoulder with your left / right hand until your knee is pointing toward the ceiling. Hold your left / right ankle with your other hand.  3. Keeping your knee steady, gently pull your left / right ankle toward your other shoulder until you feel a stretch in your buttocks.  4. Hold this position for __________ seconds.  Repeat __________ times. Complete this exercise __________ times a day.  Hip extensor  This is an exercise in which you lie on your back and pull your knee to your chest.  1. Lie on your back on a firm surface. Both of your legs should be straight.  2. Pull your left / right knee to your chest. Hold your leg in this position by holding onto the back of your thigh or the front of your knee.  3. Hold this position for __________ seconds.  4. Slowly return to the starting position.  Repeat __________ times. Complete this exercise __________ times a day.  Strengthening exercises  These exercises build strength and endurance in your hip and thigh muscles. Endurance is the ability to use your muscles for a long time, even after they get  tired.  Straight leg raises, side-lying  This exercise strengthens the muscles that rotate the leg at the hip and move it away from your body (hip abductors).  1. Lie on your side with your left / right leg in the top position. Lie so your head, shoulder, knee, and hip line up. Bend your bottom knee to help you balance.  2. Lift your top leg 4-6 inches (10-15 cm) while keeping your toes pointed straight ahead.  3. Hold this position for __________ seconds.  4. Slowly lower your leg to the starting position.  5. Let your muscles relax completely after each repetition.  Repeat __________ times. Complete this exercise __________ times a day.  Hip abduction and rotation  This is sometimes called quadruped (on hands and knees) exercises.  1. Get on your hands and knees on a firm, lightly padded surface. Your hands should be directly below your shoulders, and your knees should be directly below your hips.  2. Lift your left / right knee out to the side. Keep your knee bent. Do not twist your body.  3. Hold this position for __________ seconds.  4. Slowly lower your leg.  Repeat __________ times. Complete this exercise __________ times a day.  Straight leg raises, face-down  This exercise stretches the muscles that move your hips away from the front of the pelvis (hip extensors).  1. Lie on your abdomen on a bed or a firm surface with a pillow under your hips.  2. Squeeze your buttocks muscles and lift your left / right leg about 4-6 inches (10-15 cm) off the bed. Do not let your back arch.  3. Hold this position for __________ seconds.  4. Slowly lower your leg to the starting position.  5. Let your muscles relax completely after each repetition.  Repeat __________ times. Complete this exercise __________ times a day.  This information is not intended to replace advice given to you by your health care provider. Make sure you discuss any questions you have with your health care provider.  Document Revised: 04/09/2020  Document Reviewed: 10/10/2019  Elsevier Patient Education © 2021 [employer]

## 2021-11-14 RX ORDER — PANTOPRAZOLE SODIUM 40 MG/1
TABLET, DELAYED RELEASE ORAL
Qty: 30 TABLET | Refills: 0 | Status: SHIPPED | OUTPATIENT
Start: 2021-11-14 | End: 2021-12-08

## 2021-12-02 ENCOUNTER — TELEPHONE (OUTPATIENT)
Dept: FAMILY MEDICINE CLINIC | Facility: CLINIC | Age: 51
End: 2021-12-02

## 2021-12-02 NOTE — TELEPHONE ENCOUNTER
Pt is traveling to [place] over the holidays, he is required to have a RT-PCR Covid-19 test beforehand (NOT just a PCR test)  He is wanting to know if Norma can advise where he could have this done?  Please call pt [phone].

## 2021-12-03 NOTE — TELEPHONE ENCOUNTER
Is this something that I can order and he can done here?  I don't think the [id number] test is the RT-PCR but I do see options to order the RT-PCR COVID test just not sure which one to order and if we can do that here?

## 2021-12-08 RX ORDER — PANTOPRAZOLE SODIUM 40 MG/1
TABLET, DELAYED RELEASE ORAL
Qty: 30 TABLET | Refills: 0 | Status: SHIPPED | OUTPATIENT
Start: 2021-12-08 | End: 2022-01-03

## 2022-01-03 RX ORDER — PANTOPRAZOLE SODIUM 40 MG/1
TABLET, DELAYED RELEASE ORAL
Qty: 30 TABLET | Refills: 0 | Status: SHIPPED | OUTPATIENT
Start: 2022-01-03 | End: 2022-02-20

## 2022-02-20 RX ORDER — PANTOPRAZOLE SODIUM 40 MG/1
TABLET, DELAYED RELEASE ORAL
Qty: 30 TABLET | Refills: 0 | Status: SHIPPED | OUTPATIENT
Start: 2022-02-20 | End: 2022-03-15

## 2022-02-22 DIAGNOSIS — E78.2 MIXED HYPERLIPIDEMIA: ICD-10-CM

## 2022-02-22 RX ORDER — ATORVASTATIN CALCIUM 20 MG/1
TABLET, FILM COATED ORAL
Qty: 90 TABLET | Refills: 1 | Status: SHIPPED | OUTPATIENT
Start: 2022-02-22 | End: 2022-08-17

## 2022-03-15 RX ORDER — PANTOPRAZOLE SODIUM 40 MG/1
TABLET, DELAYED RELEASE ORAL
Qty: 30 TABLET | Refills: 0 | Status: SHIPPED | OUTPATIENT
Start: 2022-03-15 | End: 2022-03-29

## 2022-03-29 RX ORDER — PANTOPRAZOLE SODIUM 40 MG/1
TABLET, DELAYED RELEASE ORAL
Qty: 30 TABLET | Refills: 0 | Status: SHIPPED | OUTPATIENT
Start: 2022-03-29 | End: 2022-05-03

## 2022-04-29 ENCOUNTER — TELEPHONE (OUTPATIENT)
Dept: FAMILY MEDICINE CLINIC | Facility: CLINIC | Age: 52
End: 2022-04-29

## 2022-05-03 RX ORDER — PANTOPRAZOLE SODIUM 40 MG/1
TABLET, DELAYED RELEASE ORAL
Qty: 30 TABLET | Refills: 0 | Status: SHIPPED | OUTPATIENT
Start: 2022-05-03 | End: 2022-05-25

## 2022-05-25 RX ORDER — PANTOPRAZOLE SODIUM 40 MG/1
TABLET, DELAYED RELEASE ORAL
Qty: 30 TABLET | Refills: 0 | Status: SHIPPED | OUTPATIENT
Start: 2022-05-25 | End: 2022-06-19

## 2022-05-31 ENCOUNTER — TELEPHONE (OUTPATIENT)
Dept: CARDIOLOGY | Facility: CLINIC | Age: 52
End: 2022-05-31

## 2022-05-31 RX ORDER — ATENOLOL 25 MG/1
25 TABLET ORAL DAILY
Qty: 90 TABLET | Refills: 0 | Status: SHIPPED | OUTPATIENT
Start: 2022-05-31 | End: 2022-08-25

## 2022-05-31 NOTE — TELEPHONE ENCOUNTER
Needs a refill on atenolol 25 mg sent to [facility] on [address]  He is completely out Has an appointment with  the end of Annie

## 2022-06-19 RX ORDER — PANTOPRAZOLE SODIUM 40 MG/1
TABLET, DELAYED RELEASE ORAL
Qty: 30 TABLET | Refills: 0 | Status: SHIPPED | OUTPATIENT
Start: 2022-06-19 | End: 2022-07-13

## 2022-06-27 ENCOUNTER — OFFICE VISIT (OUTPATIENT)
Dept: CARDIOLOGY | Facility: CLINIC | Age: 52
End: 2022-06-27

## 2022-06-27 VITALS
SYSTOLIC BLOOD PRESSURE: 134 MMHG | HEART RATE: 92 BPM | DIASTOLIC BLOOD PRESSURE: 96 MMHG | HEIGHT: 64 IN | RESPIRATION RATE: 16 BRPM | WEIGHT: 156.2 LBS | BODY MASS INDEX: 26.67 KG/M2

## 2022-06-27 DIAGNOSIS — R00.2 PALPITATIONS: Primary | ICD-10-CM

## 2022-06-27 PROCEDURE — 99214 OFFICE O/P EST MOD 30 MIN: CPT | Performed by: INTERNAL MEDICINE

## 2022-06-27 PROCEDURE — 93000 ELECTROCARDIOGRAM COMPLETE: CPT | Performed by: INTERNAL MEDICINE

## 2022-06-27 NOTE — PROGRESS NOTES
"Cardiology Clinic Note  Juan Pablo Freire MD, PhD    Subjective:     Encounter Date:06/27/2022      Patient ID: Jessie Aguirre is a 51 y.o. male.    Chief Complaint:  Chief Complaint   Patient presents with   • Annual Exam       HPI:    I had the pleasure to see this very pleasant 51-year-old gentleman today in follow-up.  He has a history of tachycardia hypertension hyperlipidemia also with significant anxiety which provokes tachycardia and feelings of being unwell as well as palpitations.  His EKG is normal with normal sinus rhythm normal conduction narrow complex rhythm.  He has no structural heart disease.  Blood pressures controlled today at 134 systolic with heart rates in the 90s but at home his blood pressure logs are much better and his heart rates are in the 60s to 70s.  He is on atenolol, atorvastatin without aspirin appropriately, displays no anginal chest pain.  We talked about diet exercise healthy life choices low-cholesterol diet, is a non-smoker does not use alcohol and is otherwise doing well.    Review of systems otherwise negative x14 point review of systems except as mentioned above    Historical data copied forward from previous encounters in EMR including the history, exam, and assessment/plan has been reviewed and is unchanged unless noted otherwise.    Cardiac medicines reviewed with risk, benefits, and necessity of each discussed.    Risk and benefit of cardiac testing reviewed including death heart attack stroke pain bleeding infection need for vascular /cardiovascular surgery were discussed and the patient     Objective:         /96 (BP Location: Left arm, Patient Position: Sitting)   Pulse 92   Resp 16   Ht 162.6 cm (64.02\")   Wt 70.9 kg (156 lb 3.2 oz)   BMI 26.80 kg/m²     Physical Exam    Assessment:         There are no diagnoses linked to this encounter.       Plan:      Tachycardia, palpitations, controlled presently, worsened by psychosocial issues episodically, no true " cardiac arrhythmia however  Continue atenolol  Essential hypertension is well controlled  Hyperlipidemia well-controlled on present dose of statin, recheck LFTs and fax lipid panel every year  Primary prevention goals  Diet and exercise per AHA guidelines  Continue smoking abstinence    Back to clinic in a year        The pleasure to be involved in this patient's cardiovascular care.  Please call with any questions or concerns  Juan Pablo Freire MD, PhD    Most recent EKG as reviewed and interpreted by me:    ECG 12 Lead    Date/Time: 6/27/2022 5:13 PM  Performed by: Juan Pablo Freire MD  Authorized by: Juan Pablo Freire MD   Rhythm: sinus rhythm  Rate: normal  Conduction: conduction normal  ST Segments: ST segments normal  T Waves: T waves normal  QRS axis: normal    Clinical impression: normal ECG             Most recent echo as reviewed and interpreted by me:      Most recent stress test as reviewed and interpreted by me:      Most recent cardiac catheterization as reviewed interpreted by me:  No results found for this or any previous visit.    The following portions of the patient's history were reviewed and updated as appropriate: allergies, current medications, past family history, past medical history, past social history, past surgical history and problem list.      ROS:  14 point review of systems negative except as mentioned above    Current Outpatient Medications:   •  atenolol (TENORMIN) 25 MG tablet, Take 1 tablet by mouth Daily., Disp: 90 tablet, Rfl: 0  •  atorvastatin (LIPITOR) 20 MG tablet, TAKE 1 TABLET BY MOUTH EVERY DAY AT NIGHT, Disp: 90 tablet, Rfl: 1  •  fexofenadine (ALLEGRA) 60 MG tablet, Take 60 mg by mouth Daily., Disp: , Rfl:   •  finasteride (PROPECIA) 1 MG tablet, TAKE 1 TABLET BY MOUTH EVERY DAY, Disp: 30 tablet, Rfl: 11  •  pantoprazole (PROTONIX) 40 MG EC tablet, TAKE 1 TABLET BY MOUTH EVERY DAY, Disp: 30 tablet, Rfl: 0    Problem List:  Patient Active Problem List    Diagnosis   • Anxiety   • Essential hypertension   • Gastroesophageal reflux disease   • Mixed hyperlipidemia   • Tachycardia   • Vitamin D deficiency   • Palpitations   • Male pattern baldness     Past Medical History:  Past Medical History:   Diagnosis Date   • Allergic    • Allergic rhinitis    • Anxiety    • BPPV (benign paroxysmal positional vertigo)    • Essential hypertension    • GERD (gastroesophageal reflux disease)    • Hyperlipidemia    • Palpitations    • Tachycardia    • Vitamin D deficiency      Past Surgical History:  Past Surgical History:   Procedure Laterality Date   • FRACTURE SURGERY       Social History:  Social History     Socioeconomic History   • Marital status: Single   Tobacco Use   • Smoking status: Never Smoker   • Smokeless tobacco: Never Used   Vaping Use   • Vaping Use: Never used   Substance and Sexual Activity   • Alcohol use: Not Currently   • Drug use: Not Currently   • Sexual activity: Yes     Partners: Female     Allergies:  Allergies   Allergen Reactions   • Erythromycin Unknown - High Severity     Immunizations:  Immunization History   Administered Date(s) Administered   • COVID-19 (MODERNA) BOOSTER 11/24/2021   • Flu Vaccine Intradermal Quad 18-64YR 11/19/2021   • FluLaval/Fluarix/Fluzone >6 12/01/2020            In-Office Procedure(s):  No orders to display        ASCVD RIsk Score::  The 10-year ASCVD risk score (Williamkay ESQUIVEL Jr., et al., 2013) is: 2.8%    Values used to calculate the score:      Age: 51 years      Sex: Male      Is Non- : No      Diabetic: No      Tobacco smoker: No      Systolic Blood Pressure: 134 mmHg      Is BP treated: Yes      HDL Cholesterol: 46 mg/dL      Total Cholesterol: 147 mg/dL    Imaging:         Results for orders placed during the hospital encounter of 08/06/21    US Abdomen Limited    Narrative  US ABDOMEN LIMITED-    Date of Exam: 8/6/2021 10:40 AM    Indication: right upper quadrant pain; R10.11-Right upper quadrant  pain.    Comparison: Right upper quadrant ultrasound 5/28/2013.    Technique: Transverse and sagittal ultrasound images of the right upper  quadrant were obtained. Doppler evaluation was also conducted.    FINDINGS:    The pancreas has an unremarkable sonographic appearance. Liver size is  normal, 12 cm. The liver maintains normal homogeneous echotexture. No  focal hepatic lesion is identified. The liver does not appear grossly  cirrhotic or steatotic.    Main portal vein is patent with hepatopedal flow in the imaged hepatic  veins are patent.    Gallbladder is free of shadowing stone, sludge, wall thickening or  pericholecystic fluid.    Right kidney measures 9.9 cm in length without focal cortical lesion,  shadowing stone or hydronephrosis.    Common bile duct caliber is normal, 3 mm. No intrahepatic biliary ductal  dilation is seen.    No ascites is evident.    Impression  Normal right upper quadrant ultrasound examination.    Electronically Signed By-Natalie Amanda MD On:8/6/2021 12:04 PM  This report was finalized on [id number] by  Natalie Amanda MD.          Lab Review:   No visits with results within 6 Month(s) from this visit.   Latest known visit with results is:   Lab on 08/04/2021   Component Date Value   • Glucose 08/04/2021 93    • BUN 08/04/2021 14    • Creatinine 08/04/2021 1.14    • Sodium 08/04/2021 139    • Potassium 08/04/2021 4.2    • Chloride 08/04/2021 102    • CO2 08/04/2021 24.7    • Calcium 08/04/2021 9.3    • Total Protein 08/04/2021 7.0    • Albumin 08/04/2021 4.80    • ALT (SGPT) 08/04/2021 22    • AST (SGOT) 08/04/2021 19    • Alkaline Phosphatase 08/04/2021 48    • Total Bilirubin 08/04/2021 1.1    • eGFR Non  Amer 08/04/2021 69    • eGFR   Amer 08/04/2021 83    • Globulin 08/04/2021 2.2    • A/G Ratio 08/04/2021 2.2    • BUN/Creatinine Ratio 08/04/2021 12.3    • Anion Gap 08/04/2021 12.3    • Total Cholesterol 08/04/2021 147    • Triglycerides 08/04/2021 102    • HDL  Cholesterol 08/04/2021 46    • LDL Cholesterol  08/04/2021 82    • VLDL Cholesterol 08/04/2021 19    • LDL/HDL Ratio 08/04/2021 1.75    • TSH 08/04/2021 3.640    • 25 Hydroxy, Vitamin D 08/04/2021 38.2    • WBC 08/04/2021 7.79    • RBC 08/04/2021 5.16    • Hemoglobin 08/04/2021 15.0    • Hematocrit 08/04/2021 44.6    • MCV 08/04/2021 86.4    • MCH 08/04/2021 29.1    • MCHC 08/04/2021 33.6    • RDW 08/04/2021 12.1 (A)   • RDW-SD 08/04/2021 37.7    • MPV 08/04/2021 10.5    • Platelets 08/04/2021 265    • Neutrophil % 08/04/2021 51.8    • Lymphocyte % 08/04/2021 32.0    • Monocyte % 08/04/2021 8.2    • Eosinophil % 08/04/2021 6.7 (A)   • Basophil % 08/04/2021 1.2    • Immature Grans % 08/04/2021 0.1    • Neutrophils, Absolute 08/04/2021 4.04    • Lymphocytes, Absolute 08/04/2021 2.49    • Monocytes, Absolute 08/04/2021 0.64    • Eosinophils, Absolute 08/04/2021 0.52 (A)   • Basophils, Absolute 08/04/2021 0.09    • Immature Grans, Absolute 08/04/2021 0.01    • nRBC 08/04/2021 0.0      Recent labs reviewed and interpreted for clinical significance and application            Level of Care:           Juan Pablo Freire MD  06/27/22  .

## 2022-07-13 RX ORDER — PANTOPRAZOLE SODIUM 40 MG/1
TABLET, DELAYED RELEASE ORAL
Qty: 30 TABLET | Refills: 0 | Status: SHIPPED | OUTPATIENT
Start: 2022-07-13 | End: 2022-08-05

## 2022-08-05 RX ORDER — PANTOPRAZOLE SODIUM 40 MG/1
TABLET, DELAYED RELEASE ORAL
Qty: 30 TABLET | Refills: 0 | Status: SHIPPED | OUTPATIENT
Start: 2022-08-05 | End: 2022-08-30

## 2022-08-17 DIAGNOSIS — E78.2 MIXED HYPERLIPIDEMIA: ICD-10-CM

## 2022-08-17 RX ORDER — ATORVASTATIN CALCIUM 20 MG/1
TABLET, FILM COATED ORAL
Qty: 90 TABLET | Refills: 1 | Status: SHIPPED | OUTPATIENT
Start: 2022-08-17 | End: 2023-02-08

## 2022-08-25 RX ORDER — ATENOLOL 25 MG/1
TABLET ORAL
Qty: 90 TABLET | Refills: 0 | Status: SHIPPED | OUTPATIENT
Start: 2022-08-25 | End: 2022-11-21

## 2022-08-30 RX ORDER — PANTOPRAZOLE SODIUM 40 MG/1
TABLET, DELAYED RELEASE ORAL
Qty: 30 TABLET | Refills: 0 | Status: SHIPPED | OUTPATIENT
Start: 2022-08-30 | End: 2022-09-22

## 2022-09-07 RX ORDER — FINASTERIDE 1 MG/1
TABLET, FILM COATED ORAL
Qty: 90 TABLET | Refills: 3 | Status: SHIPPED | OUTPATIENT
Start: 2022-09-07

## 2022-09-12 ENCOUNTER — TELEPHONE (OUTPATIENT)
Dept: FAMILY MEDICINE CLINIC | Facility: CLINIC | Age: 52
End: 2022-09-12

## 2022-09-12 NOTE — TELEPHONE ENCOUNTER
Finasteride 1 mg tablet prior authorization has been denied.   Drug not covered/plan exclusion- your request for coverage was denied because your prescription benefit plan does not cover the requested medication.  Hyperpia message sent to patient with this information.

## 2022-09-22 RX ORDER — PANTOPRAZOLE SODIUM 40 MG/1
TABLET, DELAYED RELEASE ORAL
Qty: 30 TABLET | Refills: 0 | Status: SHIPPED | OUTPATIENT
Start: 2022-09-22 | End: 2022-10-17

## 2022-10-17 RX ORDER — PANTOPRAZOLE SODIUM 40 MG/1
TABLET, DELAYED RELEASE ORAL
Qty: 30 TABLET | Refills: 0 | Status: SHIPPED | OUTPATIENT
Start: 2022-10-17 | End: 2022-11-09

## 2022-11-09 RX ORDER — PANTOPRAZOLE SODIUM 40 MG/1
TABLET, DELAYED RELEASE ORAL
Qty: 30 TABLET | Refills: 0 | Status: SHIPPED | OUTPATIENT
Start: 2022-11-09 | End: 2023-01-03 | Stop reason: SDUPTHER

## 2022-11-21 RX ORDER — ATENOLOL 25 MG/1
TABLET ORAL
Qty: 90 TABLET | Refills: 0 | Status: SHIPPED | OUTPATIENT
Start: 2022-11-21 | End: 2023-02-15

## 2022-12-06 RX ORDER — PANTOPRAZOLE SODIUM 40 MG/1
TABLET, DELAYED RELEASE ORAL
Qty: 30 TABLET | Refills: 0 | OUTPATIENT
Start: 2022-12-06

## 2022-12-14 NOTE — TELEPHONE ENCOUNTER
I left a message on my chart to tell patient to schedule a follow up with us. Per Norma (HUB OKAY to read) if he calls.

## 2023-01-03 RX ORDER — PANTOPRAZOLE SODIUM 40 MG/1
40 TABLET, DELAYED RELEASE ORAL DAILY
Qty: 30 TABLET | Refills: 0 | Status: SHIPPED | OUTPATIENT
Start: 2023-01-03 | End: 2023-02-01

## 2023-01-03 NOTE — TELEPHONE ENCOUNTER
Caller: Jessie Aguirre    Relationship: Self    Best call back number: [phone]    Requested Prescriptions:   Requested Prescriptions     Pending Prescriptions Disp Refills   • pantoprazole (PROTONIX) 40 MG EC tablet 30 tablet 0     Sig: Take 1 tablet by mouth Daily.        Pharmacy where request should be sent: [facility] [address] [place] [facility] [address] [facility] [phone] [facility] [phone] [place]     Does the patient have less than a 3 day supply:  [] Yes  [x] No    Would you like a call back once the refill request has been completed: [] Yes [x] No    If the office needs to give you a call back, can they leave a voicemail: [] Yes [x] No    Eric Bean Rep   01/03/23 08:58 EST

## 2023-02-01 RX ORDER — PANTOPRAZOLE SODIUM 40 MG/1
TABLET, DELAYED RELEASE ORAL
Qty: 30 TABLET | Refills: 0 | Status: SHIPPED | OUTPATIENT
Start: 2023-02-01 | End: 2023-02-21 | Stop reason: SDUPTHER

## 2023-02-08 DIAGNOSIS — E78.2 MIXED HYPERLIPIDEMIA: ICD-10-CM

## 2023-02-08 RX ORDER — ATORVASTATIN CALCIUM 20 MG/1
TABLET, FILM COATED ORAL
Qty: 90 TABLET | Refills: 1 | Status: SHIPPED | OUTPATIENT
Start: 2023-02-08

## 2023-02-15 RX ORDER — ATENOLOL 25 MG/1
TABLET ORAL
Qty: 90 TABLET | Refills: 2 | Status: SHIPPED | OUTPATIENT
Start: 2023-02-15

## 2023-02-21 ENCOUNTER — OFFICE VISIT (OUTPATIENT)
Dept: FAMILY MEDICINE CLINIC | Facility: CLINIC | Age: 53
End: 2023-02-21
Payer: COMMERCIAL

## 2023-02-21 VITALS
OXYGEN SATURATION: 99 % | SYSTOLIC BLOOD PRESSURE: 132 MMHG | HEART RATE: 79 BPM | WEIGHT: 153 LBS | HEIGHT: 64 IN | TEMPERATURE: 98.6 F | BODY MASS INDEX: 26.12 KG/M2 | RESPIRATION RATE: 16 BRPM | DIASTOLIC BLOOD PRESSURE: 89 MMHG

## 2023-02-21 DIAGNOSIS — M54.50 ACUTE BILATERAL LOW BACK PAIN WITHOUT SCIATICA: ICD-10-CM

## 2023-02-21 DIAGNOSIS — Z12.5 PROSTATE CANCER SCREENING: ICD-10-CM

## 2023-02-21 DIAGNOSIS — K21.9 GASTROESOPHAGEAL REFLUX DISEASE WITHOUT ESOPHAGITIS: ICD-10-CM

## 2023-02-21 DIAGNOSIS — E78.2 MIXED HYPERLIPIDEMIA: ICD-10-CM

## 2023-02-21 DIAGNOSIS — Z00.00 ROUTINE PHYSICAL EXAMINATION: Primary | ICD-10-CM

## 2023-02-21 PROCEDURE — 99396 PREV VISIT EST AGE 40-64: CPT | Performed by: PHYSICIAN ASSISTANT

## 2023-02-21 RX ORDER — PANTOPRAZOLE SODIUM 40 MG/1
40 TABLET, DELAYED RELEASE ORAL DAILY
Qty: 90 TABLET | Refills: 3 | Status: SHIPPED | OUTPATIENT
Start: 2023-02-21

## 2023-02-21 NOTE — PROGRESS NOTES
Melia Aguirre is a 52 y.o. male.     History of Present Illness  Pt presents for routine physical. He has been doing a lot of walking.  He does enjoy rich foods and has an addiction to chocolate.  He also likes red meat and drinks a lot of coffee He takes pantoprazole and he does well with it. He also takes metamucileHe had upper endoscopy about 12 years ago.  He does sometimes get some some bloating and upset stomach. He hasn't had colonoscopy yet and wants to wait on this at this time.  He does need to go the dentist since he hasn't been in 3 years.       The following portions of the patient's history were reviewed and updated as appropriate: allergies, current medications, past family history, past medical history, past social history, past surgical history and problem list.  Past Medical History:   Diagnosis Date   • Allergic    • Allergic rhinitis    • Anxiety    • BPPV (benign paroxysmal positional vertigo)    • Essential hypertension    • GERD (gastroesophageal reflux disease)    • Hyperlipidemia    • Palpitations    • Tachycardia    • Vitamin D deficiency      Past Surgical History:   Procedure Laterality Date   • FRACTURE SURGERY       Family History   Problem Relation Age of Onset   • Arthritis Mother    • Hearing loss Mother    • Hyperlipidemia Mother    • Hypertension Mother    • Hyperlipidemia Father    • Diabetes Maternal Uncle    • Diabetes Paternal Uncle    • Diabetes Maternal Grandmother    • Diabetes Paternal Grandmother    • Heart disease Paternal Grandfather      Social History     Socioeconomic History   • Marital status: Single   Tobacco Use   • Smoking status: Never   • Smokeless tobacco: Never   Vaping Use   • Vaping Use: Never used   Substance and Sexual Activity   • Alcohol use: Not Currently     Comment: sparse and sporadic. Almost zero intake overall.   • Drug use: Never   • Sexual activity: Yes     Partners: Female     Birth control/protection: Condom         Current  "Outpatient Medications:   •  atenolol (TENORMIN) 25 MG tablet, TAKE 1 TABLET BY MOUTH EVERY DAY, Disp: 90 tablet, Rfl: 2  •  atorvastatin (LIPITOR) 20 MG tablet, TAKE 1 TABLET BY MOUTH EVERY DAY AT NIGHT, Disp: 90 tablet, Rfl: 1  •  fexofenadine (ALLEGRA) 60 MG tablet, Take 60 mg by mouth Daily., Disp: , Rfl:   •  finasteride (PROPECIA) 1 MG tablet, TAKE 1 TABLET BY MOUTH EVERY DAY, Disp: 90 tablet, Rfl: 3  •  pantoprazole (PROTONIX) 40 MG EC tablet, Take 1 tablet by mouth Daily., Disp: 90 tablet, Rfl: 3    Review of Systems   Constitutional: Negative for activity change, appetite change, chills, diaphoresis, fatigue, fever, unexpected weight gain and unexpected weight loss.   HENT: Negative for trouble swallowing.    Eyes: Negative for blurred vision and visual disturbance.   Respiratory: Negative for chest tightness, shortness of breath and wheezing.    Cardiovascular: Negative for chest pain, palpitations and leg swelling.   Gastrointestinal: Negative for abdominal pain, constipation, diarrhea, nausea, vomiting and GERD.   Genitourinary: Negative for difficulty urinating and dysuria.   Musculoskeletal: Negative for gait problem.   Neurological: Negative for dizziness, tremors, syncope, weakness, light-headedness, headache and confusion.   Psychiatric/Behavioral: Negative for sleep disturbance.     /89 (BP Location: Left arm, Patient Position: Sitting, Cuff Size: Adult)   Pulse 79   Temp 98.6 °F (37 °C) (Temporal)   Resp 16   Ht 162.6 cm (64.02\")   Wt 69.4 kg (153 lb)   SpO2 99%   BMI 26.25 kg/m²       Objective   Physical Exam  Vitals and nursing note reviewed.   Constitutional:       Appearance: Normal appearance. He is normal weight.   HENT:      Head: Normocephalic and atraumatic.      Right Ear: Tympanic membrane, ear canal and external ear normal.      Left Ear: Tympanic membrane, ear canal and external ear normal.      Nose: Nose normal.      Mouth/Throat:      Mouth: Mucous membranes are " moist.      Pharynx: Oropharynx is clear.   Eyes:      Conjunctiva/sclera: Conjunctivae normal.      Pupils: Pupils are equal, round, and reactive to light.   Neck:      Thyroid: No thyroid mass, thyromegaly or thyroid tenderness.      Vascular: No carotid bruit.   Cardiovascular:      Rate and Rhythm: Normal rate and regular rhythm.      Heart sounds: Normal heart sounds.   Pulmonary:      Effort: Pulmonary effort is normal.      Breath sounds: Normal breath sounds.   Abdominal:      General: Abdomen is flat. Bowel sounds are normal.      Palpations: Abdomen is soft.      Tenderness: There is no abdominal tenderness.   Musculoskeletal:         General: Normal range of motion.      Cervical back: Normal range of motion and neck supple.      Right lower leg: No edema.      Left lower leg: No edema.   Lymphadenopathy:      Cervical: No cervical adenopathy.   Skin:     General: Skin is warm and dry.   Neurological:      General: No focal deficit present.      Mental Status: He is alert and oriented to person, place, and time.   Psychiatric:         Mood and Affect: Mood normal.         Behavior: Behavior normal.         Thought Content: Thought content normal.         Procedures       Diagnoses and all orders for this visit:    1. Routine physical examination (Primary)  Comments:  Work on continuing to get exercise and doing back exercises.  Work on healthy diet.  Orders:  -     Comprehensive Metabolic Panel; Future  -     Lipid Panel; Future  -     CBC & Differential; Future    2. Gastroesophageal reflux disease without esophagitis  Comments:  Stable with pantoprazole.  Orders:  -     pantoprazole (PROTONIX) 40 MG EC tablet; Take 1 tablet by mouth Daily.  Dispense: 90 tablet; Refill: 3    3. Mixed hyperlipidemia  Comments:  Will check labs.  Continue statin.  Orders:  -     Comprehensive Metabolic Panel; Future  -     Lipid Panel; Future    4. Prostate cancer screening  Comments:  Will check labs. Denies any urinary  symptoms.  Orders:  -     PSA SCREENING; Future    5. Acute bilateral low back pain without sciatica  Comments:  intermittent lower back pain.  Encouraged doing his exercises and stretching.  Let me know if not improving or worsening.

## 2023-03-17 ENCOUNTER — LAB (OUTPATIENT)
Dept: FAMILY MEDICINE CLINIC | Facility: CLINIC | Age: 53
End: 2023-03-17
Payer: COMMERCIAL

## 2023-03-17 DIAGNOSIS — E78.2 MIXED HYPERLIPIDEMIA: ICD-10-CM

## 2023-03-17 DIAGNOSIS — Z00.00 ROUTINE PHYSICAL EXAMINATION: ICD-10-CM

## 2023-03-17 DIAGNOSIS — Z12.5 PROSTATE CANCER SCREENING: ICD-10-CM

## 2023-03-17 PROCEDURE — 36415 COLL VENOUS BLD VENIPUNCTURE: CPT

## 2023-03-17 PROCEDURE — 80053 COMPREHEN METABOLIC PANEL: CPT | Performed by: PHYSICIAN ASSISTANT

## 2023-03-17 PROCEDURE — 85025 COMPLETE CBC W/AUTO DIFF WBC: CPT | Performed by: PHYSICIAN ASSISTANT

## 2023-03-17 PROCEDURE — G0103 PSA SCREENING: HCPCS | Performed by: PHYSICIAN ASSISTANT

## 2023-03-17 PROCEDURE — 80061 LIPID PANEL: CPT | Performed by: PHYSICIAN ASSISTANT

## 2023-03-18 LAB
ALBUMIN SERPL-MCNC: 4.6 G/DL (ref 3.5–5.2)
ALBUMIN/GLOB SERPL: 1.7 G/DL
ALP SERPL-CCNC: 53 U/L (ref 39–117)
ALT SERPL W P-5'-P-CCNC: 24 U/L (ref 1–41)
ANION GAP SERPL CALCULATED.3IONS-SCNC: 12 MMOL/L (ref 5–15)
AST SERPL-CCNC: 21 U/L (ref 1–40)
BASOPHILS # BLD AUTO: 0.1 10*3/MM3 (ref 0–0.2)
BASOPHILS NFR BLD AUTO: 1.1 % (ref 0–1.5)
BILIRUB SERPL-MCNC: 0.9 MG/DL (ref 0–1.2)
BUN SERPL-MCNC: 18 MG/DL (ref 6–20)
BUN/CREAT SERPL: 14.5 (ref 7–25)
CALCIUM SPEC-SCNC: 9.2 MG/DL (ref 8.6–10.5)
CHLORIDE SERPL-SCNC: 100 MMOL/L (ref 98–107)
CHOLEST SERPL-MCNC: 174 MG/DL (ref 0–200)
CO2 SERPL-SCNC: 25 MMOL/L (ref 22–29)
CREAT SERPL-MCNC: 1.24 MG/DL (ref 0.76–1.27)
DEPRECATED RDW RBC AUTO: 40.5 FL (ref 37–54)
EGFRCR SERPLBLD CKD-EPI 2021: 70.8 ML/MIN/1.73
EOSINOPHIL # BLD AUTO: 0.58 10*3/MM3 (ref 0–0.4)
EOSINOPHIL NFR BLD AUTO: 6.6 % (ref 0.3–6.2)
ERYTHROCYTE [DISTWIDTH] IN BLOOD BY AUTOMATED COUNT: 12.7 % (ref 12.3–15.4)
GLOBULIN UR ELPH-MCNC: 2.7 GM/DL
GLUCOSE SERPL-MCNC: 93 MG/DL (ref 65–99)
HCT VFR BLD AUTO: 47.2 % (ref 37.5–51)
HDLC SERPL-MCNC: 45 MG/DL (ref 40–60)
HGB BLD-MCNC: 15.8 G/DL (ref 13–17.7)
IMM GRANULOCYTES # BLD AUTO: 0.04 10*3/MM3 (ref 0–0.05)
IMM GRANULOCYTES NFR BLD AUTO: 0.5 % (ref 0–0.5)
LDLC SERPL CALC-MCNC: 107 MG/DL (ref 0–100)
LDLC/HDLC SERPL: 2.32 {RATIO}
LYMPHOCYTES # BLD AUTO: 2.58 10*3/MM3 (ref 0.7–3.1)
LYMPHOCYTES NFR BLD AUTO: 29.5 % (ref 19.6–45.3)
MCH RBC QN AUTO: 29.3 PG (ref 26.6–33)
MCHC RBC AUTO-ENTMCNC: 33.5 G/DL (ref 31.5–35.7)
MCV RBC AUTO: 87.6 FL (ref 79–97)
MONOCYTES # BLD AUTO: 0.86 10*3/MM3 (ref 0.1–0.9)
MONOCYTES NFR BLD AUTO: 9.8 % (ref 5–12)
NEUTROPHILS NFR BLD AUTO: 4.6 10*3/MM3 (ref 1.7–7)
NEUTROPHILS NFR BLD AUTO: 52.5 % (ref 42.7–76)
NRBC BLD AUTO-RTO: 0 /100 WBC (ref 0–0.2)
PLATELET # BLD AUTO: 261 10*3/MM3 (ref 140–450)
PMV BLD AUTO: 10.9 FL (ref 6–12)
POTASSIUM SERPL-SCNC: 4.1 MMOL/L (ref 3.5–5.2)
PROT SERPL-MCNC: 7.3 G/DL (ref 6–8.5)
PSA SERPL-MCNC: 0.6 NG/ML (ref 0–4)
RBC # BLD AUTO: 5.39 10*6/MM3 (ref 4.14–5.8)
SODIUM SERPL-SCNC: 137 MMOL/L (ref 136–145)
TRIGL SERPL-MCNC: 124 MG/DL (ref 0–150)
VLDLC SERPL-MCNC: 22 MG/DL (ref 5–40)
WBC NRBC COR # BLD: 8.76 10*3/MM3 (ref 3.4–10.8)

## 2023-08-02 DIAGNOSIS — E78.2 MIXED HYPERLIPIDEMIA: ICD-10-CM

## 2023-08-02 RX ORDER — ATORVASTATIN CALCIUM 20 MG/1
TABLET, FILM COATED ORAL
Qty: 90 TABLET | Refills: 1 | Status: SHIPPED | OUTPATIENT
Start: 2023-08-02

## 2023-08-31 RX ORDER — FINASTERIDE 1 MG/1
TABLET, FILM COATED ORAL
Qty: 90 TABLET | Refills: 3 | Status: SHIPPED | OUTPATIENT
Start: 2023-08-31

## 2023-10-18 RX ORDER — ATENOLOL 25 MG/1
TABLET ORAL
Qty: 90 TABLET | Refills: 2 | Status: SHIPPED | OUTPATIENT
Start: 2023-10-18

## 2023-12-08 ENCOUNTER — TELEPHONE (OUTPATIENT)
Dept: CARDIOLOGY | Facility: CLINIC | Age: 53
End: 2023-12-08

## 2023-12-08 NOTE — TELEPHONE ENCOUNTER
Caller: Jessie Aguirre    Relationship: Self    Best call back number: [phone]      PATIENT CALLED IN WANTING TO DISCUSS HEART PALPS, IT IS SOMETHING HE HAS EXPERIENCED IN THE PAST, ANXIETY INDUCED. HE FEELS IT MORE WHEN HE IS SICK, HE WOULD JUST LIKE TO DISCUSS THIS.

## 2023-12-11 ENCOUNTER — TELEMEDICINE (OUTPATIENT)
Dept: CARDIOLOGY | Facility: CLINIC | Age: 53
End: 2023-12-11
Payer: COMMERCIAL

## 2023-12-11 DIAGNOSIS — F41.9 ANXIETY: ICD-10-CM

## 2023-12-11 DIAGNOSIS — R00.2 PALPITATIONS: Primary | ICD-10-CM

## 2023-12-11 DIAGNOSIS — I10 ESSENTIAL HYPERTENSION: ICD-10-CM

## 2023-12-11 DIAGNOSIS — E78.2 MIXED HYPERLIPIDEMIA: ICD-10-CM

## 2023-12-11 PROCEDURE — 99213 OFFICE O/P EST LOW 20 MIN: CPT | Performed by: INTERNAL MEDICINE

## 2023-12-11 NOTE — PROGRESS NOTES
Cardiology Clinic Note  Juan Pablo Freire MD, PhD    Subjective:     Encounter Date:12/11/2023      Patient ID: Jessie Aguirre is a 52 y.o. male.    Chief Complaint:  Chief Complaint   Patient presents with    Follow-up       HPI:  Verbal consent for telehealth obtained  Unable to complete visit using a video connection to the patient. A phone visit was used to complete this visits. Total time of discussion was 16 minutes additional time in coordination of care and documentation for total encounter time greater than 25 minutes.     I had the pleasure to see this very pleasant 52-year-old gentleman today in follow-up.  He has a history of tachycardia hypertension hyperlipidemia also with significant anxiety which provokes tachycardia and feelings of being unwell as well as palpitations.  His EKG is normal with normal sinus rhythm normal conduction narrow complex rhythm.  He has no structural heart disease.   He is on atenolol, atorvastatin without aspirin appropriately, displays no anginal chest pain.  We talked about diet exercise healthy life choices low-cholesterol diet, is a non-smoker does not use alcohol and is otherwise doing well.  No new events, high functioning NYHA class I CCS class I.  We discussed calcium scanning for risk stratification and his current lipid levels.  We discussed diet and exercise again extensively heart healthy lifestyle. He recently tested pos for Covid and is pending travel back to [place].  He's got stable fatigue no cough or fever. Palpitations stable and more provoked by stress.      Last labs demonstrate normal renal function electrolytes, LDL of only 107 with total cholesterol 174 HDL of 45 at goal, normal H&H     Normal EKG sinus rhythm with normal conduction today     Review of systems otherwise negative x14 point review of systems except as mentioned above     Historical data copied forward from previous encounters in EMR including the history, exam, and assessment/plan has been  reviewed and is unchanged unless noted otherwise.     Cardiac medicines reviewed with risk, benefits, and necessity of each discussed.     Risk and benefit of cardiac testing reviewed including death heart attack stroke pain bleeding infection need for vascular /cardiovascular surgery were discussed and the patient      Objective:      Vitals reviewed below        Tachycardia, palpitations, controlled presently, worsened by psychosocial issues/stress episodically, no true cardiac arrhythmia however  Continue atenolol  Essential hypertension is well controlled  Hyperlipidemia well-controlled on present dose of statin, fasting lipid panel every year, most recent numbers as above at goal  Primary prevention goals  Diet and exercise per AHA guidelines  Continue smoking abstinence  Calcium scan coronary calcium score pending for additional risk stratification     Back to clinic in a year, PCP interim     The pleasure to be involved in this patient's cardiovascular care.  Please call with any questions or concerns  Juan Pablo Freire MD, PhD    Most recent EKG as reviewed and interpreted by me:  Procedures     Most recent echo as reviewed and interpreted by me:      Most recent stress test as reviewed and interpreted by me:      Most recent cardiac catheterization as reviewed interpreted by me:  No results found for this or any previous visit.    The following portions of the patient's history were reviewed and updated as appropriate: allergies, current medications, past family history, past medical history, past social history, past surgical history, and problem list.      ROS:  14 point review of systems negative except as mentioned above    Current Outpatient Medications:     atenolol (TENORMIN) 25 MG tablet, TAKE 1 TABLET BY MOUTH EVERY DAY, Disp: 90 tablet, Rfl: 2    atorvastatin (LIPITOR) 20 MG tablet, TAKE 1 TABLET BY MOUTH EVERY DAY AT NIGHT, Disp: 90 tablet, Rfl: 1    fexofenadine (ALLEGRA) 60 MG tablet, Take 1  tablet by mouth Daily., Disp: , Rfl:     finasteride (PROPECIA) 1 MG tablet, TAKE 1 TABLET BY MOUTH EVERY DAY, Disp: 90 tablet, Rfl: 3    pantoprazole (PROTONIX) 40 MG EC tablet, Take 1 tablet by mouth Daily., Disp: 90 tablet, Rfl: 3    Problem List:  Patient Active Problem List   Diagnosis    Anxiety    Essential hypertension    Gastroesophageal reflux disease    Mixed hyperlipidemia    Tachycardia    Vitamin D deficiency    Palpitations    Male pattern baldness     Past Medical History:  Past Medical History:   Diagnosis Date    Allergic     Allergic rhinitis     Anxiety     BPPV (benign paroxysmal positional vertigo)     Essential hypertension     GERD (gastroesophageal reflux disease)     Hyperlipidemia     Palpitations     Tachycardia     Vitamin D deficiency      Past Surgical History:  Past Surgical History:   Procedure Laterality Date    FRACTURE SURGERY       Social History:  Social History     Socioeconomic History    Marital status: Single   Tobacco Use    Smoking status: Never    Smokeless tobacco: Never   Vaping Use    Vaping Use: Never used   Substance and Sexual Activity    Alcohol use: Not Currently     Comment: sparse and sporadic. Almost zero intake overall.    Drug use: Never    Sexual activity: Yes     Partners: Female     Birth control/protection: Condom     Allergies:  Allergies   Allergen Reactions    Erythromycin Unknown - High Severity     Immunizations:  Immunization History   Administered Date(s) Administered    COVID-19 (MODERNA) Monovalent Original Booster 11/24/2021, 10/26/2022    COVID-19 (PFIZER) Purple Cap Monovalent 03/30/2021, 04/20/2021    Flu Vaccine Intradermal Quad 18-64YR 11/19/2021, 10/26/2022    Fluzone (or Fluarix & Flulaval for VFC) >6mos 12/01/2020, 11/19/2021    Influenza Injectable Mdck Pf Quad 10/26/2022, 10/18/2023    Influenza, Unspecified 10/18/2023            In-Office Procedure(s):  No orders to display        ASCVD RIsk Score::  The 10-year ASCVD risk score  (Lindsey DAWSON, et al., 2019) is: 4.2%    Values used to calculate the score:      Age: 52 years      Sex: Male      Is Non- : No      Diabetic: No      Tobacco smoker: No      Systolic Blood Pressure: 137 mmHg      Is BP treated: Yes      HDL Cholesterol: 45 mg/dL      Total Cholesterol: 174 mg/dL    Imaging:         Results for orders placed during the hospital encounter of 08/06/21    US Abdomen Limited    Narrative  US ABDOMEN LIMITED-    Date of Exam: 8/6/2021 10:40 AM    Indication: right upper quadrant pain; R10.11-Right upper quadrant pain.    Comparison: Right upper quadrant ultrasound 5/28/2013.    Technique: Transverse and sagittal ultrasound images of the right upper  quadrant were obtained. Doppler evaluation was also conducted.    FINDINGS:    The pancreas has an unremarkable sonographic appearance. Liver size is  normal, 12 cm. The liver maintains normal homogeneous echotexture. No  focal hepatic lesion is identified. The liver does not appear grossly  cirrhotic or steatotic.    Main portal vein is patent with hepatopedal flow in the imaged hepatic  veins are patent.    Gallbladder is free of shadowing stone, sludge, wall thickening or  pericholecystic fluid.    Right kidney measures 9.9 cm in length without focal cortical lesion,  shadowing stone or hydronephrosis.    Common bile duct caliber is normal, 3 mm. No intrahepatic biliary ductal  dilation is seen.    No ascites is evident.    Impression  Normal right upper quadrant ultrasound examination.    Electronically Signed By-Natalie Amanda MD On:8/6/2021 12:04 PM  This report was finalized on [id number] by  Natalie Amanda MD.          Lab Review:   No visits with results within 6 Month(s) from this visit.   Latest known visit with results is:   Lab on 03/17/2023   Component Date Value    Glucose 03/17/2023 93     BUN 03/17/2023 18     Creatinine 03/17/2023 1.24     Sodium 03/17/2023 137     Potassium 03/17/2023 4.1      Chloride 03/17/2023 100     CO2 03/17/2023 25.0     Calcium 03/17/2023 9.2     Total Protein 03/17/2023 7.3     Albumin 03/17/2023 4.6     ALT (SGPT) 03/17/2023 24     AST (SGOT) 03/17/2023 21     Alkaline Phosphatase 03/17/2023 53     Total Bilirubin 03/17/2023 0.9     Globulin 03/17/2023 2.7     A/G Ratio 03/17/2023 1.7     BUN/Creatinine Ratio 03/17/2023 14.5     Anion Gap 03/17/2023 12.0     eGFR 03/17/2023 70.8     Total Cholesterol 03/17/2023 174     Triglycerides 03/17/2023 124     HDL Cholesterol 03/17/2023 45     LDL Cholesterol  03/17/2023 107 (H)     VLDL Cholesterol 03/17/2023 22     LDL/HDL Ratio 03/17/2023 2.32     PSA 03/17/2023 0.596     WBC 03/17/2023 8.76     RBC 03/17/2023 5.39     Hemoglobin 03/17/2023 15.8     Hematocrit 03/17/2023 47.2     MCV 03/17/2023 87.6     MCH 03/17/2023 29.3     MCHC 03/17/2023 33.5     RDW 03/17/2023 12.7     RDW-SD 03/17/2023 40.5     MPV 03/17/2023 10.9     Platelets 03/17/2023 261     Neutrophil % 03/17/2023 52.5     Lymphocyte % 03/17/2023 29.5     Monocyte % 03/17/2023 9.8     Eosinophil % 03/17/2023 6.6 (H)     Basophil % 03/17/2023 1.1     Immature Grans % 03/17/2023 0.5     Neutrophils, Absolute 03/17/2023 4.60     Lymphocytes, Absolute 03/17/2023 2.58     Monocytes, Absolute 03/17/2023 0.86     Eosinophils, Absolute 03/17/2023 0.58 (H)     Basophils, Absolute 03/17/2023 0.10     Immature Grans, Absolute 03/17/2023 0.04     nRBC 03/17/2023 0.0                     Juan Pablo Freire MD  12/11/23  .

## 2024-02-02 DIAGNOSIS — E78.2 MIXED HYPERLIPIDEMIA: ICD-10-CM

## 2024-02-02 RX ORDER — ATORVASTATIN CALCIUM 20 MG/1
TABLET, FILM COATED ORAL
Qty: 90 TABLET | Refills: 1 | Status: SHIPPED | OUTPATIENT
Start: 2024-02-02

## 2024-02-14 ENCOUNTER — TELEPHONE (OUTPATIENT)
Dept: CARDIOLOGY | Facility: CLINIC | Age: 54
End: 2024-02-14
Payer: COMMERCIAL

## 2024-02-21 ENCOUNTER — OFFICE VISIT (OUTPATIENT)
Dept: CARDIOLOGY | Facility: CLINIC | Age: 54
End: 2024-02-21
Payer: COMMERCIAL

## 2024-02-21 VITALS
SYSTOLIC BLOOD PRESSURE: 137 MMHG | RESPIRATION RATE: 18 BRPM | DIASTOLIC BLOOD PRESSURE: 84 MMHG | HEIGHT: 64 IN | HEART RATE: 88 BPM | WEIGHT: 149 LBS | BODY MASS INDEX: 25.44 KG/M2

## 2024-02-21 DIAGNOSIS — I10 ESSENTIAL HYPERTENSION: ICD-10-CM

## 2024-02-21 DIAGNOSIS — E78.2 MIXED HYPERLIPIDEMIA: Primary | ICD-10-CM

## 2024-02-21 PROCEDURE — 99214 OFFICE O/P EST MOD 30 MIN: CPT | Performed by: INTERNAL MEDICINE

## 2024-02-21 PROCEDURE — 93000 ELECTROCARDIOGRAM COMPLETE: CPT | Performed by: INTERNAL MEDICINE

## 2024-02-21 NOTE — PROGRESS NOTES
Cardiology Clinic Note  Juan Pablo Freire MD, PhD    Subjective:     Encounter Date:02/21/2024      Patient ID: Jessie Aguirre is a 53 y.o. male.    Chief Complaint:  Chief Complaint   Patient presents with    Follow-up       HPI:      I had the pleasure to see this very pleasant 53-year-old gentleman today in follow-up.  He has a history of tachycardia hypertension hyperlipidemia also with significant anxiety which provokes tachycardia and feelings of being unwell as well as palpitations also associated with some GI symptoms and reflux worse after eating episodically.  His EKG is normal with normal sinus rhythm normal conduction narrow complex rhythm.  He has no structural heart disease.   He is on atenolol, atorvastatin without aspirin appropriately, displays no anginal chest pain.  His lipids are at appropriate levels on atorvastatin.  We talked about diet exercise healthy life choices low-cholesterol diet, is a non-smoker does not use alcohol and is otherwise doing well.  No new events, high functioning NYHA class I CCS class I.  We discussed calcium scanning for risk stratification and his current lipid levels.  We discussed diet and exercise again extensively heart healthy lifestyle.   A lot of reassurance provided today           Normal EKG sinus rhythm with normal conduction today     Review of systems otherwise negative x14 point review of systems except as mentioned above     Historical data copied forward from previous encounters in EMR including the history, exam, and assessment/plan has been reviewed and is unchanged unless noted otherwise.     Cardiac medicines reviewed with risk, benefits, and necessity of each discussed.     Risk and benefit of cardiac testing reviewed including death heart attack stroke pain bleeding infection need for vascular /cardiovascular surgery were discussed and the patient      Objective:      Vitals reviewed below        Tachycardia, palpitations, controlled presently, worsened  "by psychosocial issues/stress episodically, no true cardiac arrhythmia however not dangerous any cardiac capacity, not associate with stroke risk, risk of MI cardiovascular events or heart failure  These are exacerbated by GI symptoms    He is not on a good diet, encouraged heart healthy lifestyle, decrease in sugar consumption, caffeine which she drinks coffee heavily and a lot of chocolate which does contain caffeine as well although is he is non-smoker  Continue daily exercise which also alleviate stress and anxiety and his heart healthy and also decrease inflammation and palpitations over time    Continue atenolol  Essential hypertension is well controlled  Hyperlipidemia well-controlled on present dose of statin, fasting lipid panel every year, most recent numbers as above at goal  Primary prevention goals  Diet and exercise per AHA guidelines  Continue smoking abstinence  Calcium scan coronary calcium score pending for additional risk stratification  Calcium score abnormal proceed with stress testing    Echo performed in [facility] was normal, normal LV and RV size and function normal atrial sizes no significant valvular abnormality, EF 60%  EKG unchanged, normal conduction with no abnormalities in sinus rhythm    A lot of reassurance provided today, significant anxiety over palpitations again today    Juan Pablo Freire MD, PhD      Objective:         /84 (BP Location: Right arm, Patient Position: Sitting)   Pulse 88   Resp 18   Ht 162.6 cm (64\")   Wt 67.6 kg (149 lb)   BMI 25.58 kg/m²     Physical Exam    Assessment:         Diagnoses and all orders for this visit:    1. Mixed hyperlipidemia (Primary)  -     CT Cardiac Calcium Score Without Dye; Future  -     Lipid Panel; Future    2. Essential hypertension  -     Comprehensive Metabolic Panel; Future           Plan:              The pleasure to be involved in this patient's cardiovascular care.  Please call with any questions or concerns  Juan Pablo Freire, " MD, PhD    Most recent EKG as reviewed and interpreted by me:    ECG 12 Lead    Date/Time: 2/21/2024 5:56 PM  Performed by: Juan Pablo Freire MD    Authorized by: Juan Pablo Freire MD  Comparison: not compared with previous ECG   Previous ECG: no previous ECG available  Rhythm: sinus rhythm  Rate: normal  Conduction: conduction normal  QRS axis: normal    Clinical impression: normal ECG           Most recent echo as reviewed and interpreted by me:      Most recent stress test as reviewed and interpreted by me:      Most recent cardiac catheterization as reviewed interpreted by me:  No results found for this or any previous visit.    The following portions of the patient's history were reviewed and updated as appropriate: current medications, past family history, past medical history, past social history, past surgical history, and problem list.      ROS:  14 point review of systems negative except as mentioned above    Current Outpatient Medications:     atenolol (TENORMIN) 25 MG tablet, TAKE 1 TABLET BY MOUTH EVERY DAY, Disp: 90 tablet, Rfl: 2    atorvastatin (LIPITOR) 20 MG tablet, TAKE 1 TABLET BY MOUTH EVERY DAY AT NIGHT, Disp: 90 tablet, Rfl: 1    fexofenadine (ALLEGRA) 60 MG tablet, Take 1 tablet by mouth Daily., Disp: , Rfl:     finasteride (PROPECIA) 1 MG tablet, TAKE 1 TABLET BY MOUTH EVERY DAY, Disp: 90 tablet, Rfl: 3    pantoprazole (PROTONIX) 40 MG EC tablet, Take 1 tablet by mouth Daily., Disp: 90 tablet, Rfl: 3    Problem List:  Patient Active Problem List   Diagnosis    Anxiety    Essential hypertension    Gastroesophageal reflux disease    Mixed hyperlipidemia    Tachycardia    Vitamin D deficiency    Palpitations    Male pattern baldness     Past Medical History:  Past Medical History:   Diagnosis Date    Allergic     Allergic rhinitis     Anxiety     BPPV (benign paroxysmal positional vertigo)     Essential hypertension     GERD (gastroesophageal reflux disease)     Hyperlipidemia      Palpitations     Tachycardia     Vitamin D deficiency      Past Surgical History:  Past Surgical History:   Procedure Laterality Date    FRACTURE SURGERY       Social History:  Social History     Socioeconomic History    Marital status: Single   Tobacco Use    Smoking status: Never    Smokeless tobacco: Never   Vaping Use    Vaping Use: Never used   Substance and Sexual Activity    Alcohol use: Not Currently     Comment: sparse and sporadic. Almost zero intake overall.    Drug use: Never    Sexual activity: Yes     Partners: Female     Birth control/protection: Condom     Allergies:  Allergies   Allergen Reactions    Erythromycin Unknown - High Severity     Immunizations:  Immunization History   Administered Date(s) Administered    COVID-19 (MODERNA) Monovalent Original Booster 11/24/2021, 10/26/2022    COVID-19 (PFIZER) Purple Cap Monovalent 03/30/2021, 04/20/2021    Flu Vaccine Intradermal Quad 18-64YR 11/19/2021, 10/26/2022    Fluzone (or Fluarix & Flulaval for VFC) >6mos 12/01/2020, 11/19/2021    Influenza Injectable Mdck Pf Quad 10/26/2022, 10/18/2023    Influenza, Unspecified 10/18/2023            In-Office Procedure(s):  No orders to display        ASCVD RIsk Score::  The 10-year ASCVD risk score (Lindsey DK, et al., 2019) is: 4.7%    Values used to calculate the score:      Age: 53 years      Sex: Male      Is Non- : No      Diabetic: No      Tobacco smoker: No      Systolic Blood Pressure: 137 mmHg      Is BP treated: Yes      HDL Cholesterol: 45 mg/dL      Total Cholesterol: 174 mg/dL    Imaging:         Results for orders placed during the hospital encounter of 08/06/21    US Abdomen Limited    Narrative  US ABDOMEN LIMITED-    Date of Exam: 8/6/2021 10:40 AM    Indication: right upper quadrant pain; R10.11-Right upper quadrant pain.    Comparison: Right upper quadrant ultrasound 5/28/2013.    Technique: Transverse and sagittal ultrasound images of the right upper  quadrant were  obtained. Doppler evaluation was also conducted.    FINDINGS:    The pancreas has an unremarkable sonographic appearance. Liver size is  normal, 12 cm. The liver maintains normal homogeneous echotexture. No  focal hepatic lesion is identified. The liver does not appear grossly  cirrhotic or steatotic.    Main portal vein is patent with hepatopedal flow in the imaged hepatic  veins are patent.    Gallbladder is free of shadowing stone, sludge, wall thickening or  pericholecystic fluid.    Right kidney measures 9.9 cm in length without focal cortical lesion,  shadowing stone or hydronephrosis.    Common bile duct caliber is normal, 3 mm. No intrahepatic biliary ductal  dilation is seen.    No ascites is evident.    Impression  Normal right upper quadrant ultrasound examination.    Electronically Signed By-Natalie Amanda MD On:8/6/2021 12:04 PM  This report was finalized on [id number] by  Natalie Amanda MD.          Lab Review:   No visits with results within 6 Month(s) from this visit.   Latest known visit with results is:   Lab on 03/17/2023   Component Date Value    Glucose 03/17/2023 93     BUN 03/17/2023 18     Creatinine 03/17/2023 1.24     Sodium 03/17/2023 137     Potassium 03/17/2023 4.1     Chloride 03/17/2023 100     CO2 03/17/2023 25.0     Calcium 03/17/2023 9.2     Total Protein 03/17/2023 7.3     Albumin 03/17/2023 4.6     ALT (SGPT) 03/17/2023 24     AST (SGOT) 03/17/2023 21     Alkaline Phosphatase 03/17/2023 53     Total Bilirubin 03/17/2023 0.9     Globulin 03/17/2023 2.7     A/G Ratio 03/17/2023 1.7     BUN/Creatinine Ratio 03/17/2023 14.5     Anion Gap 03/17/2023 12.0     eGFR 03/17/2023 70.8     Total Cholesterol 03/17/2023 174     Triglycerides 03/17/2023 124     HDL Cholesterol 03/17/2023 45     LDL Cholesterol  03/17/2023 107 (H)     VLDL Cholesterol 03/17/2023 22     LDL/HDL Ratio 03/17/2023 2.32     PSA 03/17/2023 0.596     WBC 03/17/2023 8.76     RBC 03/17/2023 5.39     Hemoglobin  03/17/2023 15.8     Hematocrit 03/17/2023 47.2     MCV 03/17/2023 87.6     MCH 03/17/2023 29.3     MCHC 03/17/2023 33.5     RDW 03/17/2023 12.7     RDW-SD 03/17/2023 40.5     MPV 03/17/2023 10.9     Platelets 03/17/2023 261     Neutrophil % 03/17/2023 52.5     Lymphocyte % 03/17/2023 29.5     Monocyte % 03/17/2023 9.8     Eosinophil % 03/17/2023 6.6 (H)     Basophil % 03/17/2023 1.1     Immature Grans % 03/17/2023 0.5     Neutrophils, Absolute 03/17/2023 4.60     Lymphocytes, Absolute 03/17/2023 2.58     Monocytes, Absolute 03/17/2023 0.86     Eosinophils, Absolute 03/17/2023 0.58 (H)     Basophils, Absolute 03/17/2023 0.10     Immature Grans, Absolute 03/17/2023 0.04     nRBC 03/17/2023 0.0      Recent labs reviewed and interpreted for clinical significance and application            Level of Care:           Juan Pablo Freire MD  02/21/24  .

## 2024-02-22 ENCOUNTER — OFFICE VISIT (OUTPATIENT)
Dept: FAMILY MEDICINE CLINIC | Facility: CLINIC | Age: 54
End: 2024-02-22
Payer: COMMERCIAL

## 2024-02-22 VITALS
TEMPERATURE: 97.7 F | WEIGHT: 148.4 LBS | BODY MASS INDEX: 25.33 KG/M2 | RESPIRATION RATE: 18 BRPM | DIASTOLIC BLOOD PRESSURE: 82 MMHG | SYSTOLIC BLOOD PRESSURE: 117 MMHG | HEART RATE: 67 BPM | HEIGHT: 64 IN | OXYGEN SATURATION: 100 %

## 2024-02-22 DIAGNOSIS — Z12.5 PROSTATE CANCER SCREENING: ICD-10-CM

## 2024-02-22 DIAGNOSIS — J30.9 ALLERGIC RHINITIS, UNSPECIFIED SEASONALITY, UNSPECIFIED TRIGGER: ICD-10-CM

## 2024-02-22 DIAGNOSIS — Z00.00 ROUTINE PHYSICAL EXAMINATION: Primary | ICD-10-CM

## 2024-02-22 DIAGNOSIS — R00.2 PALPITATIONS: ICD-10-CM

## 2024-02-22 DIAGNOSIS — K21.9 GASTROESOPHAGEAL REFLUX DISEASE WITHOUT ESOPHAGITIS: ICD-10-CM

## 2024-02-22 PROCEDURE — 99396 PREV VISIT EST AGE 40-64: CPT | Performed by: PHYSICIAN ASSISTANT

## 2024-02-22 RX ORDER — PANTOPRAZOLE SODIUM 40 MG/1
40 TABLET, DELAYED RELEASE ORAL DAILY
Qty: 90 TABLET | Refills: 3 | Status: SHIPPED | OUTPATIENT
Start: 2024-02-22

## 2024-02-22 NOTE — PROGRESS NOTES
Melia Aguirre is a 53 y.o. male.     History of Present Illness  Pt presents for routine physical and refill on medications.  He does still get occasional bloating and upper abdominal irritation/pain.  He does drink coffee and eat rich food along with chocolate addiction. He does get anxious. He has been having more anxiety as well as feels like his abdominal symptoms have worsened over the past 3 months. He is taking the pantoprazole which has been helpful.  He occasionally does get nausea.  The pain is not intolerable.  He hasn't had endoscopy in over 13 years. In 2002, he would feel a flutter that he felt was in the center of his chest in his esophagus.  He was seen by a doctor at that time and told it was likely esophageal spasms.  He has felt more symptoms over the past 3 months where he will feel a wave like feeling in the esophagus and then his heart rate will skip when lying down at night sometimes after eating a rich meal. He did speak to his cardiologist, Dr. Freire, about it yesterday who assured him that it was likely ectopic beats and nothing concerning. He wonders if he should have an endoscopy. He does also feel like he has flitters in his esophagus when he is lying down and turns over to change positions. Bowel movements have been normal.  He doesn't want to do colonoscopy yet since he is nervous about it.   He would also like to have food allergy as well as respiratory allergy testing.    He is exercising with walking mainly usually 1 hour daily.         Past Medical History:   Diagnosis Date    Allergic     Allergic rhinitis     Anxiety     BPPV (benign paroxysmal positional vertigo)     Essential hypertension     GERD (gastroesophageal reflux disease)     Hyperlipidemia     Palpitations     Tachycardia     Vitamin D deficiency      Past Surgical History:   Procedure Laterality Date    FRACTURE SURGERY       Family History   Problem Relation Age of Onset    Arthritis Mother     Hearing  loss Mother     Hyperlipidemia Mother     Hypertension Mother     Hyperlipidemia Father     Diabetes Maternal Uncle     Diabetes Paternal Uncle     Diabetes Maternal Grandmother     Diabetes Paternal Grandmother     Heart disease Paternal Grandfather      Social History     Socioeconomic History    Marital status: Single   Tobacco Use    Smoking status: Never    Smokeless tobacco: Never   Vaping Use    Vaping Use: Never used   Substance and Sexual Activity    Alcohol use: Not Currently     Comment: sparse and sporadic. Almost zero intake overall.    Drug use: Never    Sexual activity: Yes     Partners: Female     Birth control/protection: Condom         Current Outpatient Medications:     atenolol (TENORMIN) 25 MG tablet, TAKE 1 TABLET BY MOUTH EVERY DAY, Disp: 90 tablet, Rfl: 2    atorvastatin (LIPITOR) 20 MG tablet, TAKE 1 TABLET BY MOUTH EVERY DAY AT NIGHT, Disp: 90 tablet, Rfl: 1    fexofenadine (ALLEGRA) 60 MG tablet, Take 1 tablet by mouth Daily., Disp: , Rfl:     finasteride (PROPECIA) 1 MG tablet, TAKE 1 TABLET BY MOUTH EVERY DAY, Disp: 90 tablet, Rfl: 3    pantoprazole (PROTONIX) 40 MG EC tablet, Take 1 tablet by mouth Daily., Disp: 90 tablet, Rfl: 3    Review of Systems   Constitutional:  Negative for activity change, appetite change, chills, diaphoresis, fatigue, fever, unexpected weight gain and unexpected weight loss.   HENT:  Negative for trouble swallowing.    Eyes:  Negative for blurred vision and visual disturbance.   Respiratory:  Negative for chest tightness and shortness of breath.    Cardiovascular:  Positive for palpitations. Negative for chest pain and leg swelling.   Gastrointestinal:  Positive for GERD. Negative for abdominal pain, constipation, diarrhea, nausea, vomiting and indigestion.   Musculoskeletal:  Negative for gait problem.   Neurological:  Negative for dizziness, tremors, syncope, weakness, light-headedness, headache and confusion.   Psychiatric/Behavioral:  Negative for sleep  "disturbance. The patient is nervous/anxious.      /82 (BP Location: Left arm, Patient Position: Sitting, Cuff Size: Adult)   Pulse 67   Temp 97.7 °F (36.5 °C) (Temporal)   Resp 18   Ht 162.6 cm (64.02\")   Wt 67.3 kg (148 lb 6.4 oz)   SpO2 100%   BMI 25.46 kg/m²       Objective   Physical Exam  Vitals and nursing note reviewed.   Constitutional:       Appearance: Normal appearance. He is normal weight.   HENT:      Head: Normocephalic and atraumatic.      Right Ear: Tympanic membrane, ear canal and external ear normal.      Left Ear: Tympanic membrane, ear canal and external ear normal.      Nose: Nose normal.      Mouth/Throat:      Mouth: Mucous membranes are moist.      Pharynx: Oropharynx is clear.   Eyes:      Conjunctiva/sclera: Conjunctivae normal.      Pupils: Pupils are equal, round, and reactive to light.   Neck:      Thyroid: No thyroid mass, thyromegaly or thyroid tenderness.      Vascular: No carotid bruit.   Cardiovascular:      Rate and Rhythm: Normal rate and regular rhythm.      Heart sounds: Normal heart sounds.   Pulmonary:      Effort: Pulmonary effort is normal.      Breath sounds: Normal breath sounds.   Abdominal:      General: Abdomen is flat. Bowel sounds are normal.      Palpations: Abdomen is soft.   Musculoskeletal:         General: Normal range of motion.      Cervical back: Normal range of motion and neck supple.      Right lower leg: No edema.      Left lower leg: No edema.   Lymphadenopathy:      Cervical: No cervical adenopathy.   Skin:     General: Skin is warm and dry.   Neurological:      General: No focal deficit present.      Mental Status: He is alert and oriented to person, place, and time.   Psychiatric:         Mood and Affect: Mood normal.         Behavior: Behavior normal.         Thought Content: Thought content normal.         Procedures     Assessment    Diagnoses and all orders for this visit:    1. Routine physical examination " (Primary)  Comments:  Continue exercising regularly at least 150 minutes per week.  Work on healthy diet.    2. Prostate cancer screening  Comments:  Get labs done on or after March 18th.  Orders:  -     PSA SCREENING; Future    3. Gastroesophageal reflux disease without esophagitis  Comments:  Continue pantoprazole. Referral entered to GI.  Orders:  -     Food Allergy Profile; Future  -     pantoprazole (PROTONIX) 40 MG EC tablet; Take 1 tablet by mouth Daily.  Dispense: 90 tablet; Refill: 3  -     Ambulatory Referral to Gastroenterology    4. Allergic rhinitis, unspecified seasonality, unspecified trigger  Comments:  Will check food and respiratory allergy testing.  Orders:  -     Allergen Profile with Component Reflexes, Respiratory-Area 5; Future    5. Palpitations  Comments:  Reassurance given.  Recently saw Dr. Freire.  Orders:  -     Magnesium; Future

## 2024-03-05 ENCOUNTER — TELEPHONE (OUTPATIENT)
Dept: FAMILY MEDICINE CLINIC | Facility: CLINIC | Age: 54
End: 2024-03-05
Payer: COMMERCIAL

## 2024-03-05 NOTE — TELEPHONE ENCOUNTER
Pt stopped in the office today and is requesting a allergy referral if possible. He said he believes this was possibly discussed at his previous appt. Thanks.

## 2024-03-06 DIAGNOSIS — J30.9 ALLERGIC RHINITIS, UNSPECIFIED SEASONALITY, UNSPECIFIED TRIGGER: Primary | ICD-10-CM

## 2024-03-13 ENCOUNTER — HOSPITAL ENCOUNTER (OUTPATIENT)
Facility: HOSPITAL | Age: 54
Discharge: HOME | End: 2024-03-13
Admitting: INTERNAL MEDICINE

## 2024-03-13 DIAGNOSIS — E78.2 MIXED HYPERLIPIDEMIA: ICD-10-CM

## 2024-03-13 PROCEDURE — 75571 CT HRT W/O DYE W/CA TEST: CPT

## 2024-03-18 ENCOUNTER — OFFICE (OUTPATIENT)
Dept: URBAN - METROPOLITAN AREA CLINIC 64 | Facility: CLINIC | Age: 54
End: 2024-03-18

## 2024-03-18 VITALS
WEIGHT: 149 LBS | HEART RATE: 76 BPM | DIASTOLIC BLOOD PRESSURE: 80 MMHG | HEIGHT: 66 IN | SYSTOLIC BLOOD PRESSURE: 112 MMHG

## 2024-03-18 DIAGNOSIS — R07.89 OTHER CHEST PAIN: ICD-10-CM

## 2024-03-18 DIAGNOSIS — R14.0 ABDOMINAL DISTENSION (GASEOUS): ICD-10-CM

## 2024-03-18 DIAGNOSIS — R10.13 EPIGASTRIC PAIN: ICD-10-CM

## 2024-03-18 DIAGNOSIS — K21.9 GASTRO-ESOPHAGEAL REFLUX DISEASE WITHOUT ESOPHAGITIS: ICD-10-CM

## 2024-03-18 PROCEDURE — 99203 OFFICE O/P NEW LOW 30 MIN: CPT | Performed by: NURSE PRACTITIONER

## 2024-03-21 ENCOUNTER — TELEPHONE (OUTPATIENT)
Dept: CARDIOLOGY | Facility: CLINIC | Age: 54
End: 2024-03-21

## 2024-03-21 NOTE — TELEPHONE ENCOUNTER
Caller: Jessie Aguirre    Relationship to patient: Self    Best call back number:  [phone]    Patient is needing:  PATIENT RETURNING CALL ABOUT CALCIUM SCORE RESULTS. PATIENT DID SEE RESULTS ON MYCHART.

## 2024-03-22 ENCOUNTER — LAB (OUTPATIENT)
Dept: FAMILY MEDICINE CLINIC | Facility: CLINIC | Age: 54
End: 2024-03-22
Payer: COMMERCIAL

## 2024-03-22 DIAGNOSIS — J30.9 ALLERGIC RHINITIS, UNSPECIFIED SEASONALITY, UNSPECIFIED TRIGGER: ICD-10-CM

## 2024-03-22 DIAGNOSIS — I10 ESSENTIAL HYPERTENSION: ICD-10-CM

## 2024-03-22 DIAGNOSIS — E78.2 MIXED HYPERLIPIDEMIA: ICD-10-CM

## 2024-03-22 DIAGNOSIS — R00.2 PALPITATIONS: ICD-10-CM

## 2024-03-22 DIAGNOSIS — K21.9 GASTROESOPHAGEAL REFLUX DISEASE WITHOUT ESOPHAGITIS: ICD-10-CM

## 2024-03-22 DIAGNOSIS — Z12.5 PROSTATE CANCER SCREENING: ICD-10-CM

## 2024-03-22 LAB
ALBUMIN SERPL-MCNC: 4.7 G/DL (ref 3.5–5.2)
ALBUMIN/GLOB SERPL: 2 G/DL
ALP SERPL-CCNC: 62 U/L (ref 39–117)
ALT SERPL W P-5'-P-CCNC: 20 U/L (ref 1–41)
ANION GAP SERPL CALCULATED.3IONS-SCNC: 10.1 MMOL/L (ref 5–15)
AST SERPL-CCNC: 20 U/L (ref 1–40)
BILIRUB SERPL-MCNC: 0.8 MG/DL (ref 0–1.2)
BUN SERPL-MCNC: 15 MG/DL (ref 6–20)
BUN/CREAT SERPL: 12.6 (ref 7–25)
CALCIUM SPEC-SCNC: 9.3 MG/DL (ref 8.6–10.5)
CHLORIDE SERPL-SCNC: 103 MMOL/L (ref 98–107)
CHOLEST SERPL-MCNC: 144 MG/DL (ref 0–200)
CO2 SERPL-SCNC: 25.9 MMOL/L (ref 22–29)
CREAT SERPL-MCNC: 1.19 MG/DL (ref 0.76–1.27)
EGFRCR SERPLBLD CKD-EPI 2021: 74 ML/MIN/1.73
GLOBULIN UR ELPH-MCNC: 2.3 GM/DL
GLUCOSE SERPL-MCNC: 97 MG/DL (ref 65–99)
HDLC SERPL-MCNC: 47 MG/DL (ref 40–60)
LDLC SERPL CALC-MCNC: 82 MG/DL (ref 0–100)
LDLC/HDLC SERPL: 1.74 {RATIO}
MAGNESIUM SERPL-MCNC: 2.3 MG/DL (ref 1.6–2.6)
POTASSIUM SERPL-SCNC: 4.2 MMOL/L (ref 3.5–5.2)
PROT SERPL-MCNC: 7 G/DL (ref 6–8.5)
PSA SERPL-MCNC: 0.59 NG/ML (ref 0–4)
SODIUM SERPL-SCNC: 139 MMOL/L (ref 136–145)
TRIGL SERPL-MCNC: 77 MG/DL (ref 0–150)
VLDLC SERPL-MCNC: 15 MG/DL (ref 5–40)

## 2024-03-22 PROCEDURE — 86003 ALLG SPEC IGE CRUDE XTRC EA: CPT | Performed by: PHYSICIAN ASSISTANT

## 2024-03-22 PROCEDURE — 83735 ASSAY OF MAGNESIUM: CPT | Performed by: INTERNAL MEDICINE

## 2024-03-22 PROCEDURE — 36415 COLL VENOUS BLD VENIPUNCTURE: CPT

## 2024-03-22 PROCEDURE — 80053 COMPREHEN METABOLIC PANEL: CPT | Performed by: INTERNAL MEDICINE

## 2024-03-22 PROCEDURE — 82785 ASSAY OF IGE: CPT | Performed by: PHYSICIAN ASSISTANT

## 2024-03-22 PROCEDURE — G0103 PSA SCREENING: HCPCS | Performed by: INTERNAL MEDICINE

## 2024-03-22 PROCEDURE — 80061 LIPID PANEL: CPT | Performed by: INTERNAL MEDICINE

## 2024-03-26 ENCOUNTER — TELEMEDICINE (OUTPATIENT)
Dept: CARDIOLOGY | Facility: CLINIC | Age: 54
End: 2024-03-26
Payer: COMMERCIAL

## 2024-03-26 DIAGNOSIS — E78.2 MIXED HYPERLIPIDEMIA: Primary | ICD-10-CM

## 2024-03-26 DIAGNOSIS — R00.2 PALPITATIONS: ICD-10-CM

## 2024-03-26 DIAGNOSIS — R00.0 TACHYCARDIA: ICD-10-CM

## 2024-03-26 DIAGNOSIS — I10 ESSENTIAL HYPERTENSION: ICD-10-CM

## 2024-03-26 PROCEDURE — 99213 OFFICE O/P EST LOW 20 MIN: CPT | Performed by: INTERNAL MEDICINE

## 2024-03-29 LAB
CLAM IGE QN: <0.1 KU/L
CODFISH IGE QN: <0.1 KU/L
CONV CLASS DESCRIPTION: ABNORMAL
CORN IGE QN: <0.1 KU/L
COW MILK IGE QN: <0.1 KU/L
EGG WHITE IGE QN: <0.1 KU/L
PEANUT IGE QN: <0.1 KU/L
SCALLOP IGE QN: <0.1 KU/L
SESAME SEED IGE QN: <0.1 KU/L
SHRIMP IGE QN: 0.56 KU/L
SOYBEAN IGE QN: <0.1 KU/L
WALNUT IGE QN: <0.1 KU/L
WHEAT IGE QN: <0.1 KU/L

## 2024-03-30 LAB
A ALTERNATA IGE QN: <0.1 KU/L
A FUMIGATUS IGE QN: <0.1 KU/L
BERMUDA GRASS IGE QN: <0.1 KU/L
BOXELDER IGE QN: <0.1 KU/L
C HERBARUM IGE QN: <0.1 KU/L
CALIF WALNUT POLN IGE QN: <0.1 KU/L
CAT DANDER IGE QN: <0.1 KU/L
CMN PIGWEED IGE QN: <0.1 KU/L
COMMON RAGWEED IGE QN: <0.1 KU/L
CONV CLASS DESCRIPTION: NORMAL
COTTONWOOD IGE QN: <0.1 KU/L
D FARINAE IGE QN: <0.1 KU/L
D PTERONYSS IGE QN: <0.1 KU/L
DOG DANDER IGE QN: <0.1 KU/L
IGE SERPL-ACNC: 34 IU/ML (ref 6–495)
LONDON PLANE IGE QN: <0.1 KU/L
MOUSE URINE PROT IGE QN: <0.1 KU/L
MT JUNIPER IGE QN: <0.1 KU/L
P NOTATUM IGE QN: <0.1 KU/L
PECAN/HICK TREE IGE QN: <0.1 KU/L
ROACH IGE QN: <0.1 KU/L
SALTWORT IGE QN: <0.1 KU/L
SHEEP SORREL IGE QN: <0.1 KU/L
SILVER BIRCH IGE QN: <0.1 KU/L
TIMOTHY IGE QN: <0.1 KU/L
WHITE ASH IGE QN: <0.1 KU/L
WHITE ELM IGE QN: <0.1 KU/L
WHITE MULBERRY IGE QN: <0.1 KU/L
WHITE OAK IGE QN: <0.1 KU/L

## 2024-04-12 NOTE — PROGRESS NOTES
Cardiology Clinic Note  Juan Pablo Freire MD, PhD    Subjective:     Encounter Date:03/26/2024      Patient ID: Jessie Aguirre is a 53 y.o. male.    Chief Complaint:  Chief Complaint   Patient presents with    Follow-up       HPI:    Verbal consent for telehealth obtained  Unable to complete visit using a video connection to the patient. A phone visit was used to complete this visits. Total time of discussion was 13 minutes additional time and charting coordination of care and documentation for total encounter time greater than 25 minutes.     I had the pleasure to see this very pleasant 53-year-old gentleman today in follow-up via telehealth.  He has a history of tachycardia hypertension hyperlipidemia also with significant anxiety which provokes tachycardia and feelings of being unwell as well as palpitations also associated with some GI symptoms and reflux worse after eating episodically.  His EKG has always been normal with normal sinus rhythm normal conduction narrow complex rhythm.  He has no structural heart disease.   He is on atenolol, atorvastatin without aspirin appropriately, displays no anginal chest pain.  His lipids are at appropriate levels on atorvastatin which she is taking chronically and in longstanding fashion prior to establish care here in our clinic.  We talked about diet exercise healthy life choices low-cholesterol diet, is a non-smoker does not use alcohol and is otherwise doing well.  No new events, high functioning NYHA class I CCS class I still on encounter today with no events or hospitalizations, he underwent calcium scan with a calcium score of 0 representing very low risk of CV events over the next 5 years with minimal burden of atherosclerotic disease..  Again a lot of reassurance provided today again on encounter with patient with underlying significant anxiety although he has a calcium score of 0, lipids that are at goal, high functioning status, no history of any CV events, blood  pressure has always been at goal     Previously normal EKG sinus rhythm with normal conduction      Review of systems otherwise negative x14 point review of systems except as mentioned above     Historical data copied forward from previous encounters in EMR including the history, exam, and assessment/plan has been reviewed and is unchanged unless noted otherwise.     Cardiac medicines reviewed with risk, benefits, and necessity of each discussed.     Risk and benefit of cardiac testing reviewed including death heart attack stroke pain bleeding infection need for vascular /cardiovascular surgery were discussed and the patient      Objective:      Vitals reviewed below        Tachycardia, palpitations, controlled presently, worsened by psychosocial issues/stress episodically, no true cardiac arrhythmia however not dangerous any cardiac capacity, not associate with stroke risk, risk of MI cardiovascular events or heart failure  These are exacerbated by GI symptoms  Reassurance provided     He continues to struggle with his diet he says, encouraged heart healthy lifestyle, decrease in sugar consumption of which he uses chocolate, caffeine which she drinks coffee heavily ,  he is non-smoker  Continue daily exercise which also alleviate stress and anxiety and his heart healthy and also decrease inflammation and palpitations over time     Continue atenolol  Essential hypertension is well controlled  Hyperlipidemia well-controlled on present dose of statin, fasting lipid panel every year, most recent numbers as above at goal  Primary prevention goals  Diet and exercise per AHA guidelines  Continue smoking abstinence  Calcium score of 0  No indication for ischemic testing or echo at this time     Echo performed in [facility] previously was normal, normal LV and RV size and function normal atrial sizes no significant valvular abnormality, EF 60%    Follow-up 1 to 2 years, primary in the interim     Juan Pablo Freire MD, PhD      The  pleasure to be involved in this patient's cardiovascular care.  Please call with any questions or concerns  Juan Pablo Freire MD, PhD    Most recent EKG as reviewed and interpreted by me:  Procedures     Most recent echo as reviewed and interpreted by me:      Most recent stress test as reviewed and interpreted by me:      Most recent cardiac catheterization as reviewed interpreted by me:  No results found for this or any previous visit.    The following portions of the patient's history were reviewed and updated as appropriate: allergies, current medications, past family history, past medical history, past social history, past surgical history, and problem list.      ROS:  14 point review of systems negative except as mentioned above    Current Outpatient Medications:     atenolol (TENORMIN) 25 MG tablet, TAKE 1 TABLET BY MOUTH EVERY DAY, Disp: 90 tablet, Rfl: 2    atorvastatin (LIPITOR) 20 MG tablet, TAKE 1 TABLET BY MOUTH EVERY DAY AT NIGHT, Disp: 90 tablet, Rfl: 1    fexofenadine (ALLEGRA) 60 MG tablet, Take 1 tablet by mouth Daily., Disp: , Rfl:     finasteride (PROPECIA) 1 MG tablet, TAKE 1 TABLET BY MOUTH EVERY DAY, Disp: 90 tablet, Rfl: 3    pantoprazole (PROTONIX) 40 MG EC tablet, Take 1 tablet by mouth Daily., Disp: 90 tablet, Rfl: 3    Problem List:  Patient Active Problem List   Diagnosis    Anxiety    Essential hypertension    Gastroesophageal reflux disease    Mixed hyperlipidemia    Tachycardia    Vitamin D deficiency    Palpitations    Male pattern baldness     Past Medical History:  Past Medical History:   Diagnosis Date    Allergic     Allergic rhinitis     Anxiety     BPPV (benign paroxysmal positional vertigo)     Essential hypertension     GERD (gastroesophageal reflux disease)     Hyperlipidemia     Palpitations     Tachycardia     Vitamin D deficiency      Past Surgical History:  Past Surgical History:   Procedure Laterality Date    FRACTURE SURGERY       Social History:  Social History      Socioeconomic History    Marital status: Single   Tobacco Use    Smoking status: Never    Smokeless tobacco: Never   Vaping Use    Vaping status: Never Used   Substance and Sexual Activity    Alcohol use: Not Currently     Comment: sparse and sporadic. Almost zero intake overall.    Drug use: Never    Sexual activity: Yes     Partners: Female     Birth control/protection: Condom     Allergies:  Allergies   Allergen Reactions    Erythromycin Unknown - High Severity     Immunizations:  Immunization History   Administered Date(s) Administered    COVID-19 (MODERNA) Monovalent Original Booster 11/24/2021, 10/26/2022    COVID-19 (PFIZER) Purple Cap Monovalent 03/30/2021, 04/20/2021    Flu Vaccine Intradermal Quad 18-64YR 11/19/2021, 10/26/2022    Fluzone (or Fluarix & Flulaval for VFC) >6mos 12/01/2020, 11/19/2021    Influenza Injectable Mdck Pf Quad 10/26/2022, 10/18/2023    Influenza, Unspecified 10/18/2023            In-Office Procedure(s):  No orders to display        ASCVD RIsk Score::  The 10-year ASCVD risk score (Lindsey DK, et al., 2019) is: 2.6%    Values used to calculate the score:      Age: 53 years      Sex: Male      Is Non- : No      Diabetic: No      Tobacco smoker: No      Systolic Blood Pressure: 117 mmHg      Is BP treated: Yes      HDL Cholesterol: 47 mg/dL      Total Cholesterol: 144 mg/dL    Imaging:         Results for orders placed during the hospital encounter of 03/13/24    CT Cardiac Calcium Score Without Dye    Narrative  CORONARY ARTERY CALCIUM SCORING    HISTORY: 53-year-old male. Coronary artery calcium scoring.    TECHNIQUE: Radiation dose reduction techniques were utilized, including  automated exposure control and exposure modulation based on body size.  Regions of interest delineated all areas of coronary artery  calcification on an ECG-gated noncontrasted cardiac CT scan.    FINDINGS: These regions were quantitatively scored for calcium as  follows:    Score  1: LM -- Calcium Score: 0, Volume(mm3): 0, Mass(mg): 0    Score 2: LAD -- Calcium Score: 0, Volume(mm3): 0, Mass(mg): 0    Score 3: CX -- Calcium Score: 0, Volume(mm3): 0, Mass(mg): 0    Score 4: RCA -- Calcium Score: 0, Volume(mm3): 0, Mass(mg): 0    Score 5: PDA -- Calcium Score: 0, Volume(mm3): 0, Mass(mg): 0        Total: Calcium Score: 0, Volume(mm3): 0, Mass(mg): 0    Impression  Total coronary artery Agatston calcification score is 0.    These findings represent no identifiable coronary atherosclerotic  burden.    A score of < 10 represents only a minimal coronary atherosclerotic  burden with a negative predictive value of 90-95%.    A score of  represents a mild coronary atherosclerotic burden with  a relative risk of death of 1.6 as compared to individuals with a score  of < 10.  The risk is higher if this score is > 75% for this age group  or if there is > 1 calcified vessel.    A score of 101-400 represents a moderate coronary atherosclerotic burden  with a relative risk of death of 1.7 as compared to individuals with a  score of < 10.  The risk is higher if this score is > 75% for this age  group or if there is > 1 calcified vessel.    A score of 401-1000 represents a large coronary atherosclerotic burden  with a relative risk of death of 2.5 as compared to individuals with a  score of < 10.  The risk is higher if this score is > 75% for this age  group or if there is > 1 calcified vessel.    A score of > 1000 represents an extensive coronary atherosclerotic  burden with a relative risk of death of 4 as compared to individuals  with a score of < 10.  The risk is higher if this score is > 75% for  this age group or if there is > 1 calcified vessel.    There are no airspace opacities, effusions, or lymphadenopathy within  the visualized chest.    Dr. Marylou Crocker M.D reviewed the images and diagnostic data and  performed the interpretation.      This report was finalized on 3/17/2024 4:27 PM by   Marylou Crocker M.D on  Workstation: [id number]      Results for orders placed during the hospital encounter of 03/13/24    CT Cardiac Calcium Score Without Dye    Narrative  CORONARY ARTERY CALCIUM SCORING    HISTORY: 53-year-old male. Coronary artery calcium scoring.    TECHNIQUE: Radiation dose reduction techniques were utilized, including  automated exposure control and exposure modulation based on body size.  Regions of interest delineated all areas of coronary artery  calcification on an ECG-gated noncontrasted cardiac CT scan.    FINDINGS: These regions were quantitatively scored for calcium as  follows:    Score 1: LM -- Calcium Score: 0, Volume(mm3): 0, Mass(mg): 0    Score 2: LAD -- Calcium Score: 0, Volume(mm3): 0, Mass(mg): 0    Score 3: CX -- Calcium Score: 0, Volume(mm3): 0, Mass(mg): 0    Score 4: RCA -- Calcium Score: 0, Volume(mm3): 0, Mass(mg): 0    Score 5: PDA -- Calcium Score: 0, Volume(mm3): 0, Mass(mg): 0        Total: Calcium Score: 0, Volume(mm3): 0, Mass(mg): 0    Impression  Total coronary artery Agatston calcification score is 0.    These findings represent no identifiable coronary atherosclerotic  burden.    A score of < 10 represents only a minimal coronary atherosclerotic  burden with a negative predictive value of 90-95%.    A score of  represents a mild coronary atherosclerotic burden with  a relative risk of death of 1.6 as compared to individuals with a score  of < 10.  The risk is higher if this score is > 75% for this age group  or if there is > 1 calcified vessel.    A score of 101-400 represents a moderate coronary atherosclerotic burden  with a relative risk of death of 1.7 as compared to individuals with a  score of < 10.  The risk is higher if this score is > 75% for this age  group or if there is > 1 calcified vessel.    A score of 401-1000 represents a large coronary atherosclerotic burden  with a relative risk of death of 2.5 as compared to individuals with a  score of  < 10.  The risk is higher if this score is > 75% for this age  group or if there is > 1 calcified vessel.    A score of > 1000 represents an extensive coronary atherosclerotic  burden with a relative risk of death of 4 as compared to individuals  with a score of < 10.  The risk is higher if this score is > 75% for  this age group or if there is > 1 calcified vessel.    There are no airspace opacities, effusions, or lymphadenopathy within  the visualized chest.    Dr. Marylou Crocker M.D reviewed the images and diagnostic data and  performed the interpretation.      This report was finalized on 3/17/2024 4:27 PM by Dr. Marylou Crocker M.D on  Workstation: [id number]      Lab Review:   Lab on 03/22/2024   Component Date Value    Total Cholesterol 03/22/2024 144     Triglycerides 03/22/2024 77     HDL Cholesterol 03/22/2024 47     LDL Cholesterol  03/22/2024 82     VLDL Cholesterol 03/22/2024 15     LDL/HDL Ratio 03/22/2024 1.74     Glucose 03/22/2024 97     BUN 03/22/2024 15     Creatinine 03/22/2024 1.19     Sodium 03/22/2024 139     Potassium 03/22/2024 4.2     Chloride 03/22/2024 103     CO2 03/22/2024 25.9     Calcium 03/22/2024 9.3     Total Protein 03/22/2024 7.0     Albumin 03/22/2024 4.7     ALT (SGPT) 03/22/2024 20     AST (SGOT) 03/22/2024 20     Alkaline Phosphatase 03/22/2024 62     Total Bilirubin 03/22/2024 0.8     Globulin 03/22/2024 2.3     A/G Ratio 03/22/2024 2.0     BUN/Creatinine Ratio 03/22/2024 12.6     Anion Gap 03/22/2024 10.1     eGFR 03/22/2024 74.0     PSA 03/22/2024 0.594     Class Description 03/22/2024 Comment     Egg White 03/22/2024 <0.10     Peanut 03/22/2024 <0.10     Soybean 03/22/2024 <0.10     Milk, Cow's 03/22/2024 <0.10     Clams 03/22/2024 <0.10     Shrimp 03/22/2024 0.56 (A)     [place] 03/22/2024 <0.10     CodFish 03/22/2024 <0.10     Scallop 03/22/2024 <0.10     Wheat 03/22/2024 <0.10     Corn 03/22/2024 <0.10     Sesame Seed 03/22/2024 <0.10     Magnesium 03/22/2024 2.3     Class  Description 03/22/2024 Comment     IgE 03/22/2024 34     D. pteronyssinus (dust m* 03/22/2024 <0.10     D. farinae (dust mite) 03/22/2024 <0.10     Cat Dander 03/22/2024 <0.10     Dog Dander, IgE 03/22/2024 <0.10     [place] Grass 03/22/2024 <0.10     Zack Grass 03/22/2024 <0.10     Cockroach,Norwegian 03/22/2024 <0.10     Penicillium chrysogen 03/22/2024 <0.10     Cladosporium herbarum 03/22/2024 <0.10     Aspergillus fumigatus 03/22/2024 <0.10     Alternaria alternata 03/22/2024 <0.10     Maple/[place] 03/22/2024 <0.10     Birch, Silver 03/22/2024 <0.10     [place], Mountain 03/22/2024 <0.10     Oak, White 03/22/2024 <0.10     Elm, American 03/22/2024 <0.10     [place], Pollen 03/22/2024 <0.10     Maple [place] 03/22/2024 <0.10     [place] Tree 03/22/2024 <0.10     Harsh, White 03/22/2024 <0.10     Pecan/Br[place] Tree 03/22/2024 <0.10     [place] 03/22/2024 <0.10     Ragweed, Common/Short 03/22/2024 <0.10     Russian Thistle 03/22/2024 <0.10     Pigweed, Rough/Common 03/22/2024 <0.10     Sheep Sorrel 03/22/2024 <0.10     Mouse Urine 03/22/2024 <0.10      Recent labs reviewed and interpreted for clinical significance and application            Level of Care:           Juan Pablo Freire MD  04/12/24  .

## 2024-04-26 ENCOUNTER — OFFICE (OUTPATIENT)
Dept: URBAN - METROPOLITAN AREA PATHOLOGY 19 | Facility: PATHOLOGY | Age: 54
End: 2024-04-26

## 2024-04-26 ENCOUNTER — ON CAMPUS - OUTPATIENT (OUTPATIENT)
Dept: URBAN - METROPOLITAN AREA HOSPITAL 2 | Facility: HOSPITAL | Age: 54
End: 2024-04-26

## 2024-04-26 VITALS
HEART RATE: 85 BPM | DIASTOLIC BLOOD PRESSURE: 98 MMHG | HEART RATE: 82 BPM | HEART RATE: 84 BPM | DIASTOLIC BLOOD PRESSURE: 87 MMHG | RESPIRATION RATE: 14 BRPM | RESPIRATION RATE: 16 BRPM | RESPIRATION RATE: 18 BRPM | SYSTOLIC BLOOD PRESSURE: 103 MMHG | OXYGEN SATURATION: 100 % | SYSTOLIC BLOOD PRESSURE: 97 MMHG | DIASTOLIC BLOOD PRESSURE: 66 MMHG | RESPIRATION RATE: 13 BRPM | TEMPERATURE: 97.8 F | OXYGEN SATURATION: 98 % | OXYGEN SATURATION: 93 % | DIASTOLIC BLOOD PRESSURE: 73 MMHG | WEIGHT: 147 LBS | DIASTOLIC BLOOD PRESSURE: 55 MMHG | HEART RATE: 81 BPM | DIASTOLIC BLOOD PRESSURE: 50 MMHG | SYSTOLIC BLOOD PRESSURE: 93 MMHG | RESPIRATION RATE: 20 BRPM | SYSTOLIC BLOOD PRESSURE: 80 MMHG | RESPIRATION RATE: 12 BRPM | DIASTOLIC BLOOD PRESSURE: 46 MMHG | HEART RATE: 76 BPM | SYSTOLIC BLOOD PRESSURE: 134 MMHG | SYSTOLIC BLOOD PRESSURE: 83 MMHG | SYSTOLIC BLOOD PRESSURE: 109 MMHG | HEART RATE: 72 BPM | HEIGHT: 66 IN

## 2024-04-26 DIAGNOSIS — K44.9 DIAPHRAGMATIC HERNIA WITHOUT OBSTRUCTION OR GANGRENE: ICD-10-CM

## 2024-04-26 DIAGNOSIS — K31.89 OTHER DISEASES OF STOMACH AND DUODENUM: ICD-10-CM

## 2024-04-26 DIAGNOSIS — K21.9 GASTRO-ESOPHAGEAL REFLUX DISEASE WITHOUT ESOPHAGITIS: ICD-10-CM

## 2024-04-26 LAB
GI HISTOLOGY: A. STOMACH ANTRUM: (no result)
GI HISTOLOGY: B. LOWER THIRD OF THE ESOPHAGUS: (no result)
GI HISTOLOGY: PDF REPORT: (no result)

## 2024-04-26 PROCEDURE — 88305 TISSUE EXAM BY PATHOLOGIST: CPT | Performed by: PATHOLOGY

## 2024-04-26 PROCEDURE — 43239 EGD BIOPSY SINGLE/MULTIPLE: CPT | Performed by: INTERNAL MEDICINE

## 2024-04-26 PROCEDURE — 43450 DILATE ESOPHAGUS 1/MULT PASS: CPT | Performed by: INTERNAL MEDICINE

## 2024-04-26 NOTE — SERVICEHPINOTES
HAYLEY VIVAS  is a  53  male   who presents today for a  EGD   for   the indications listed below. The updated Patient Profile was reviewed prior to the procedure, in conjunction with the Physical Exam, including medical conditions, surgical procedures, medications, allergies, family history and social history. See Physical Exam time stamp below for date and time of HPI completion.Pre-operatively, I reviewed the indication(s) for the procedure, the risks of the procedure [including but not limited to: unexpected bleeding possibly requiring hospitalization and/or unplanned repeat procedures, perforation possibly requiring surgical treatment, missed lesions and complications of sedation/general anesthesia (also explained by anesthesia staff)]. I have evaluated the patient for risks associated with the planned anesthesia and the procedure to be performed and find the patient an acceptable candidate for IV sedation.Multiple opportunities were provided for any questions or concerns, and all questions were answered satisfactorily before any anesthesia was administered. We will proceed with the planned procedure.br

## 2024-05-13 PROBLEM — K22.70 BARRETT'S ESOPHAGUS WITHOUT DYSPLASIA: Status: ACTIVE | Noted: 2024-04-26

## 2024-06-05 ENCOUNTER — OFFICE (OUTPATIENT)
Dept: URBAN - METROPOLITAN AREA CLINIC 64 | Facility: CLINIC | Age: 54
End: 2024-06-05

## 2024-06-05 VITALS
HEIGHT: 66 IN | HEART RATE: 85 BPM | SYSTOLIC BLOOD PRESSURE: 122 MMHG | WEIGHT: 147 LBS | DIASTOLIC BLOOD PRESSURE: 81 MMHG

## 2024-06-05 DIAGNOSIS — K21.9 GASTRO-ESOPHAGEAL REFLUX DISEASE WITHOUT ESOPHAGITIS: ICD-10-CM

## 2024-06-05 PROCEDURE — 99213 OFFICE O/P EST LOW 20 MIN: CPT | Performed by: INTERNAL MEDICINE

## 2024-07-16 DIAGNOSIS — E78.2 MIXED HYPERLIPIDEMIA: ICD-10-CM

## 2024-07-16 RX ORDER — ATORVASTATIN CALCIUM 20 MG/1
TABLET, FILM COATED ORAL
Qty: 90 TABLET | Refills: 1 | Status: SHIPPED | OUTPATIENT
Start: 2024-07-16

## 2024-07-22 RX ORDER — ATENOLOL 25 MG/1
TABLET ORAL
Qty: 90 TABLET | Refills: 2 | Status: SHIPPED | OUTPATIENT
Start: 2024-07-22

## 2024-08-29 RX ORDER — FINASTERIDE 1 MG/1
TABLET, FILM COATED ORAL
Qty: 90 TABLET | Refills: 3 | Status: SHIPPED | OUTPATIENT
Start: 2024-08-29

## 2024-11-12 ENCOUNTER — OFFICE (OUTPATIENT)
Age: 54
End: 2024-11-12

## 2024-11-12 ENCOUNTER — OFFICE (OUTPATIENT)
Dept: URBAN - METROPOLITAN AREA CLINIC 64 | Facility: CLINIC | Age: 54
End: 2024-11-12

## 2024-11-12 VITALS
SYSTOLIC BLOOD PRESSURE: 137 MMHG | HEIGHT: 66 IN | HEART RATE: 77 BPM | WEIGHT: 151 LBS | HEART RATE: 77 BPM | HEART RATE: 77 BPM | HEIGHT: 66 IN | SYSTOLIC BLOOD PRESSURE: 137 MMHG | HEIGHT: 66 IN | DIASTOLIC BLOOD PRESSURE: 89 MMHG | DIASTOLIC BLOOD PRESSURE: 89 MMHG | HEART RATE: 77 BPM | DIASTOLIC BLOOD PRESSURE: 89 MMHG | SYSTOLIC BLOOD PRESSURE: 137 MMHG | WEIGHT: 151 LBS | HEIGHT: 66 IN | SYSTOLIC BLOOD PRESSURE: 137 MMHG | HEIGHT: 66 IN | DIASTOLIC BLOOD PRESSURE: 89 MMHG | SYSTOLIC BLOOD PRESSURE: 137 MMHG | SYSTOLIC BLOOD PRESSURE: 137 MMHG | DIASTOLIC BLOOD PRESSURE: 89 MMHG | DIASTOLIC BLOOD PRESSURE: 89 MMHG | DIASTOLIC BLOOD PRESSURE: 89 MMHG | HEART RATE: 77 BPM | WEIGHT: 151 LBS | HEIGHT: 66 IN | WEIGHT: 151 LBS | WEIGHT: 151 LBS | HEART RATE: 77 BPM | SYSTOLIC BLOOD PRESSURE: 137 MMHG | HEIGHT: 66 IN | HEART RATE: 77 BPM | WEIGHT: 151 LBS | WEIGHT: 151 LBS

## 2024-11-12 DIAGNOSIS — K21.9 GASTRO-ESOPHAGEAL REFLUX DISEASE WITHOUT ESOPHAGITIS: ICD-10-CM

## 2024-11-12 DIAGNOSIS — K44.9 DIAPHRAGMATIC HERNIA WITHOUT OBSTRUCTION OR GANGRENE: ICD-10-CM

## 2024-11-12 PROCEDURE — 99213 OFFICE O/P EST LOW 20 MIN: CPT | Performed by: NURSE PRACTITIONER

## 2024-11-12 RX ORDER — DICYCLOMINE HYDROCHLORIDE 10 MG/1
30 CAPSULE ORAL
Qty: 90 | Refills: 3 | Status: ACTIVE
Start: 2024-11-12

## 2024-12-02 DIAGNOSIS — E78.2 MIXED HYPERLIPIDEMIA: ICD-10-CM

## 2024-12-02 RX ORDER — ATORVASTATIN CALCIUM 20 MG/1
TABLET, FILM COATED ORAL
Qty: 90 TABLET | Refills: 1 | Status: SHIPPED | OUTPATIENT
Start: 2024-12-02

## 2024-12-25 NOTE — TELEPHONE ENCOUNTER
LOV 6/25/20   Lab Results   Component Value Date    EGFR 58 12/25/2024    EGFR 51 12/24/2024    EGFR 46 12/21/2024    CREATININE 1.26 12/25/2024    CREATININE 1.39 (H) 12/24/2024    CREATININE 1.52 (H) 12/21/2024   Baseline Cr 1.1-1.3, approaching baseline

## 2025-01-23 ENCOUNTER — OFFICE (OUTPATIENT)
Dept: URBAN - METROPOLITAN AREA CLINIC 64 | Facility: CLINIC | Age: 55
End: 2025-01-23
Payer: COMMERCIAL

## 2025-01-23 VITALS
HEART RATE: 69 BPM | HEIGHT: 66 IN | DIASTOLIC BLOOD PRESSURE: 86 MMHG | SYSTOLIC BLOOD PRESSURE: 108 MMHG | WEIGHT: 147 LBS

## 2025-01-23 DIAGNOSIS — Z12.11 ENCOUNTER FOR SCREENING FOR MALIGNANT NEOPLASM OF COLON: ICD-10-CM

## 2025-01-23 DIAGNOSIS — K21.9 GASTRO-ESOPHAGEAL REFLUX DISEASE WITHOUT ESOPHAGITIS: ICD-10-CM

## 2025-01-23 PROCEDURE — 99213 OFFICE O/P EST LOW 20 MIN: CPT | Performed by: INTERNAL MEDICINE

## 2025-02-03 DIAGNOSIS — K21.9 GASTROESOPHAGEAL REFLUX DISEASE WITHOUT ESOPHAGITIS: ICD-10-CM

## 2025-02-03 RX ORDER — ATENOLOL 25 MG/1
TABLET ORAL
Qty: 90 TABLET | Refills: 2 | Status: SHIPPED | OUTPATIENT
Start: 2025-02-03

## 2025-02-03 RX ORDER — PANTOPRAZOLE SODIUM 40 MG/1
40 TABLET, DELAYED RELEASE ORAL DAILY
Qty: 90 TABLET | Refills: 3 | Status: SHIPPED | OUTPATIENT
Start: 2025-02-03

## 2025-02-18 ENCOUNTER — OFFICE VISIT (OUTPATIENT)
Dept: CARDIOLOGY | Facility: CLINIC | Age: 55
End: 2025-02-18
Payer: COMMERCIAL

## 2025-02-18 VITALS
RESPIRATION RATE: 18 BRPM | DIASTOLIC BLOOD PRESSURE: 80 MMHG | HEART RATE: 83 BPM | HEIGHT: 64 IN | OXYGEN SATURATION: 100 % | BODY MASS INDEX: 25.44 KG/M2 | WEIGHT: 149 LBS | SYSTOLIC BLOOD PRESSURE: 123 MMHG

## 2025-02-18 DIAGNOSIS — E78.2 MIXED HYPERLIPIDEMIA: ICD-10-CM

## 2025-02-18 DIAGNOSIS — F41.9 ANXIETY: Primary | ICD-10-CM

## 2025-02-18 PROCEDURE — 99213 OFFICE O/P EST LOW 20 MIN: CPT | Performed by: INTERNAL MEDICINE

## 2025-02-18 RX ORDER — ATENOLOL 25 MG/1
25 TABLET ORAL DAILY
Qty: 90 TABLET | Refills: 3 | Status: SHIPPED | OUTPATIENT
Start: 2025-02-18

## 2025-02-18 RX ORDER — ATORVASTATIN CALCIUM 20 MG/1
20 TABLET, FILM COATED ORAL
Qty: 90 TABLET | Refills: 3 | Status: SHIPPED | OUTPATIENT
Start: 2025-02-18

## 2025-02-18 NOTE — PROGRESS NOTES
Cardiology Clinic Note  Juan Pablo Freire MD, PhD    Subjective:     Encounter Date:02/18/2025      Patient ID: Jessie Aguirre is a 54 y.o. male.    Chief Complaint:  Chief Complaint   Patient presents with    Follow-up       HPI:        I had the pleasure to see this very pleasant 54-year-old gentleman today in follow-up.  He has a history of tachycardia hypertension hyperlipidemia also with recurrent significant anxiety which provokes tachycardia and feelings of being unwell as well as palpitations also associated with some GI symptoms and reflux worse after eating episodically.  His EKG has always been normal with normal sinus rhythm normal conduction narrow complex rhythm.  He has no structural heart disease.   He is on atenolol, atorvastatin without aspirin appropriately, displays no anginal chest pain.  His lipids are at appropriate levels on atorvastatin which he is taking chronically and in longstanding fashion prior to establish care here in our clinic.  Last LDL was less than 100.  We talked about diet exercise healthy life choices low-cholesterol diet, is a non-smoker does not use alcohol and is otherwise doing well.    Today on encounter, no new events, high functioning NYHA class I CCS class I still on encounter today with no events or hospitalizations, 2024 he underwent calcium scan with a calcium score of 0 representing very low risk of CV events over the next 5 years with minimal burden of atherosclerotic disease..  Again a lot of reassurance provided today again on encounter with patient with underlying significant anxiety although he has a calcium score of 0, lipids that are at goal, high functioning status, no history of any CV events, blood pressure has always been at goal     Previously normal EKG sinus rhythm with normal conduction      Review of systems otherwise negative x14 point review of systems except as mentioned above     Historical data copied forward from previous encounters in EMR  including the history, exam, and assessment/plan has been reviewed and is unchanged unless noted otherwise.     Cardiac medicines reviewed with risk, benefits, and necessity of each discussed.     Risk and benefit of cardiac testing reviewed including death heart attack stroke pain bleeding infection need for vascular /cardiovascular surgery were discussed and the patient      Objective:      Vitals reviewed below        Tachycardia, palpitations, controlled presently, worsened by psychosocial issues/stress episodically, no true cardiac arrhythmia however not dangerous any cardiac capacity, not associate with stroke risk, risk of MI cardiovascular events or heart failure  These are exacerbated by GI symptoms  Reassurance provided     He continues to struggle with his diet he says, encouraged heart healthy lifestyle, decrease in sugar consumption of which he uses chocolate, caffeine which she drinks coffee heavily ,  he is non-smoker  Continue daily exercise which also alleviate stress and anxiety and his heart healthy and also decrease inflammation and palpitations over time     Continue atenolol  Essential hypertension is well controlled  Hyperlipidemia well-controlled on present dose of statin, fasting lipid panel every year, most recent numbers as above at goal  Primary prevention goals  Diet and exercise per AHA guidelines  Continue smoking abstinence  Calcium score of 0  No indication for ischemic testing or echo at this time     Echo performed in [facility] previously was normal, normal LV and RV size and function normal atrial sizes no significant valvular abnormality, EF 60%     Follow-up 1 to 2 years, primary in the interim     Juan Pablo Freire MD, PhD              The pleasure to be involved in this patient's cardiovascular care.  Please call with any questions or concerns  Juan Pablo Freire MD, PhD    Most recent EKG as reviewed and interpreted by me:  Procedures     Most recent echo as reviewed and interpreted by  me:      Most recent stress test as reviewed and interpreted by me:      Most recent cardiac catheterization as reviewed interpreted by me:  No results found for this or any previous visit.    The following portions of the patient's history were reviewed and updated as appropriate: allergies, current medications, past family history, past medical history, past social history, past surgical history, and problem list.      ROS:  14 point review of systems negative except as mentioned above    Current Outpatient Medications:     atenolol (TENORMIN) 25 MG tablet, TAKE 1 TABLET BY MOUTH EVERY DAY, Disp: 90 tablet, Rfl: 2    atorvastatin (LIPITOR) 20 MG tablet, TAKE 1 TABLET BY MOUTH EVERY DAY AT NIGHT, Disp: 90 tablet, Rfl: 1    fexofenadine (ALLEGRA) 60 MG tablet, Take 1 tablet by mouth Daily., Disp: , Rfl:     finasteride (PROPECIA) 1 MG tablet, TAKE 1 TABLET BY MOUTH EVERY DAY, Disp: 90 tablet, Rfl: 3    pantoprazole (PROTONIX) 40 MG EC tablet, TAKE 1 TABLET BY MOUTH EVERY DAY, Disp: 90 tablet, Rfl: 3    Problem List:  Patient Active Problem List   Diagnosis    Anxiety    Essential hypertension    Gastroesophageal reflux disease    Mixed hyperlipidemia    Tachycardia    Vitamin D deficiency    Palpitations    Male pattern baldness     Past Medical History:  Past Medical History:   Diagnosis Date    Allergic     Allergic rhinitis     Anxiety     BPPV (benign paroxysmal positional vertigo)     Essential hypertension     GERD (gastroesophageal reflux disease)     Hyperlipidemia     Palpitations     Tachycardia     Vitamin D deficiency      Past Surgical History:  Past Surgical History:   Procedure Laterality Date    FRACTURE SURGERY       Social History:  Social History     Socioeconomic History    Marital status: Single   Tobacco Use    Smoking status: Never    Smokeless tobacco: Never   Vaping Use    Vaping status: Never Used   Substance and Sexual Activity    Alcohol use: Not Currently     Comment: sparse and  sporadic. Almost zero intake overall.    Drug use: Never    Sexual activity: Yes     Partners: Female     Birth control/protection: Condom     Allergies:  Allergies   Allergen Reactions    Erythromycin Unknown - High Severity     Immunizations:  Immunization History   Administered Date(s) Administered    COVID-19 (MODERNA) Monovalent Original Booster 11/24/2021, 10/26/2022    COVID-19 (PFIZER) Purple Cap Monovalent 03/30/2021, 04/20/2021    Flu Vaccine Intradermal Quad 18-64YR 11/19/2021, 10/26/2022    Fluzone  >6mos 10/02/2024    Fluzone (or Fluarix & Flulaval for VFC) >6mos 12/01/2020, 11/19/2021    Influenza Injectable Mdck Pf Quad 10/26/2022, 10/18/2023    Influenza, Unspecified 10/18/2023            In-Office Procedure(s):  No orders to display        ASCVD RIsk Score::  The 10-year ASCVD risk score (Lindsey DAWSON, et al., 2019) is: 2.9%    Values used to calculate the score:      Age: 54 years      Sex: Male      Is Non- : No      Diabetic: No      Tobacco smoker: No      Systolic Blood Pressure: 117 mmHg      Is BP treated: Yes      HDL Cholesterol: 47 mg/dL      Total Cholesterol: 144 mg/dL    Imaging:         Results for orders placed during the hospital encounter of 03/13/24    CT Cardiac Calcium Score Without Dye    Narrative  CORONARY ARTERY CALCIUM SCORING    HISTORY: 53-year-old male. Coronary artery calcium scoring.    TECHNIQUE: Radiation dose reduction techniques were utilized, including  automated exposure control and exposure modulation based on body size.  Regions of interest delineated all areas of coronary artery  calcification on an ECG-gated noncontrasted cardiac CT scan.    FINDINGS: These regions were quantitatively scored for calcium as  follows:    Score 1: LM -- Calcium Score: 0, Volume(mm3): 0, Mass(mg): 0    Score 2: LAD -- Calcium Score: 0, Volume(mm3): 0, Mass(mg): 0    Score 3: CX -- Calcium Score: 0, Volume(mm3): 0, Mass(mg): 0    Score 4: RCA -- Calcium Score:  0, Volume(mm3): 0, Mass(mg): 0    Score 5: PDA -- Calcium Score: 0, Volume(mm3): 0, Mass(mg): 0        Total: Calcium Score: 0, Volume(mm3): 0, Mass(mg): 0    Impression  Total coronary artery Agatston calcification score is 0.    These findings represent no identifiable coronary atherosclerotic  burden.    A score of < 10 represents only a minimal coronary atherosclerotic  burden with a negative predictive value of 90-95%.    A score of  represents a mild coronary atherosclerotic burden with  a relative risk of death of 1.6 as compared to individuals with a score  of < 10.  The risk is higher if this score is > 75% for this age group  or if there is > 1 calcified vessel.    A score of 101-400 represents a moderate coronary atherosclerotic burden  with a relative risk of death of 1.7 as compared to individuals with a  score of < 10.  The risk is higher if this score is > 75% for this age  group or if there is > 1 calcified vessel.    A score of 401-1000 represents a large coronary atherosclerotic burden  with a relative risk of death of 2.5 as compared to individuals with a  score of < 10.  The risk is higher if this score is > 75% for this age  group or if there is > 1 calcified vessel.    A score of > 1000 represents an extensive coronary atherosclerotic  burden with a relative risk of death of 4 as compared to individuals  with a score of < 10.  The risk is higher if this score is > 75% for  this age group or if there is > 1 calcified vessel.    There are no airspace opacities, effusions, or lymphadenopathy within  the visualized chest.    Dr. Marylou Crocker M.D reviewed the images and diagnostic data and  performed the interpretation.      This report was finalized on 3/17/2024 4:27 PM by Dr. Marylou Crocker M.D on  Workstation: [id number]      Results for orders placed during the hospital encounter of 03/13/24    CT Cardiac Calcium Score Without Dye    Narrative  CORONARY ARTERY CALCIUM SCORING    HISTORY:  53-year-old male. Coronary artery calcium scoring.    TECHNIQUE: Radiation dose reduction techniques were utilized, including  automated exposure control and exposure modulation based on body size.  Regions of interest delineated all areas of coronary artery  calcification on an ECG-gated noncontrasted cardiac CT scan.    FINDINGS: These regions were quantitatively scored for calcium as  follows:    Score 1: LM -- Calcium Score: 0, Volume(mm3): 0, Mass(mg): 0    Score 2: LAD -- Calcium Score: 0, Volume(mm3): 0, Mass(mg): 0    Score 3: CX -- Calcium Score: 0, Volume(mm3): 0, Mass(mg): 0    Score 4: RCA -- Calcium Score: 0, Volume(mm3): 0, Mass(mg): 0    Score 5: PDA -- Calcium Score: 0, Volume(mm3): 0, Mass(mg): 0        Total: Calcium Score: 0, Volume(mm3): 0, Mass(mg): 0    Impression  Total coronary artery Agatston calcification score is 0.    These findings represent no identifiable coronary atherosclerotic  burden.    A score of < 10 represents only a minimal coronary atherosclerotic  burden with a negative predictive value of 90-95%.    A score of  represents a mild coronary atherosclerotic burden with  a relative risk of death of 1.6 as compared to individuals with a score  of < 10.  The risk is higher if this score is > 75% for this age group  or if there is > 1 calcified vessel.    A score of 101-400 represents a moderate coronary atherosclerotic burden  with a relative risk of death of 1.7 as compared to individuals with a  score of < 10.  The risk is higher if this score is > 75% for this age  group or if there is > 1 calcified vessel.    A score of 401-1000 represents a large coronary atherosclerotic burden  with a relative risk of death of 2.5 as compared to individuals with a  score of < 10.  The risk is higher if this score is > 75% for this age  group or if there is > 1 calcified vessel.    A score of > 1000 represents an extensive coronary atherosclerotic  burden with a relative risk of death  of 4 as compared to individuals  with a score of < 10.  The risk is higher if this score is > 75% for  this age group or if there is > 1 calcified vessel.    There are no airspace opacities, effusions, or lymphadenopathy within  the visualized chest.    Dr. Marylou Crocker M.D reviewed the images and diagnostic data and  performed the interpretation.      This report was finalized on 3/17/2024 4:27 PM by Dr. Marylou Crocker M.D on  Workstation: [id number]      Lab Review:   No visits with results within 6 Month(s) from this visit.   Latest known visit with results is:   Lab on 03/22/2024   Component Date Value    Total Cholesterol 03/22/2024 144     Triglycerides 03/22/2024 77     HDL Cholesterol 03/22/2024 47     LDL Cholesterol  03/22/2024 82     VLDL Cholesterol 03/22/2024 15     LDL/HDL Ratio 03/22/2024 1.74     Glucose 03/22/2024 97     BUN 03/22/2024 15     Creatinine 03/22/2024 1.19     Sodium 03/22/2024 139     Potassium 03/22/2024 4.2     Chloride 03/22/2024 103     CO2 03/22/2024 25.9     Calcium 03/22/2024 9.3     Total Protein 03/22/2024 7.0     Albumin 03/22/2024 4.7     ALT (SGPT) 03/22/2024 20     AST (SGOT) 03/22/2024 20     Alkaline Phosphatase 03/22/2024 62     Total Bilirubin 03/22/2024 0.8     Globulin 03/22/2024 2.3     A/G Ratio 03/22/2024 2.0     BUN/Creatinine Ratio 03/22/2024 12.6     Anion Gap 03/22/2024 10.1     eGFR 03/22/2024 74.0     PSA 03/22/2024 0.594     Class Description 03/22/2024 Comment     Egg White 03/22/2024 <0.10     Peanut 03/22/2024 <0.10     Soybean 03/22/2024 <0.10     Milk, Cow's 03/22/2024 <0.10     Clams 03/22/2024 <0.10     Shrimp 03/22/2024 0.56 (A)     [place] 03/22/2024 <0.10     CodFish 03/22/2024 <0.10     Scallop 03/22/2024 <0.10     Wheat 03/22/2024 <0.10     Corn 03/22/2024 <0.10     Sesame Seed 03/22/2024 <0.10     Magnesium 03/22/2024 2.3     Class Description 03/22/2024 Comment     IgE 03/22/2024 34     D. pteronyssinus (dust m* 03/22/2024 <0.10     D. farinae  (dust mite) 03/22/2024 <0.10     Cat Dander 03/22/2024 <0.10     Dog Dander, IgE 03/22/2024 <0.10     [place] Grass 03/22/2024 <0.10     Zack Grass 03/22/2024 <0.10     Cockroach,Ivorian 03/22/2024 <0.10     Penicillium chrysogen 03/22/2024 <0.10     Cladosporium herbarum 03/22/2024 <0.10     Aspergillus fumigatus 03/22/2024 <0.10     Alternaria alternata 03/22/2024 <0.10     Maple/[place]hart 03/22/2024 <0.10     Birch, Silver 03/22/2024 <0.10     [place], Mountain 03/22/2024 <0.10     Oak, White 03/22/2024 <0.10     Elm, American 03/22/2024 <0.10     [place], Pollen 03/22/2024 <0.10     Maple [place] 03/22/2024 <0.10     [place] Tree 03/22/2024 <0.10     Harsh, White 03/22/2024 <0.10     Pecan/Cascade Tree 03/22/2024 <0.10     White [place] 03/22/2024 <0.10     Ragweed, Common/Short 03/22/2024 <0.10     Russian Thistle 03/22/2024 <0.10     Pigweed, Rough/Common 03/22/2024 <0.10     Sheep Sorrel 03/22/2024 <0.10     Mouse Urine 03/22/2024 <0.10      Recent labs reviewed and interpreted for clinical significance and application            Level of Care:           Juan Pablo Freire MD  02/18/25  .

## 2025-07-14 ENCOUNTER — RESULTS FOLLOW-UP (OUTPATIENT)
Dept: FAMILY MEDICINE CLINIC | Facility: CLINIC | Age: 55
End: 2025-07-14

## 2025-07-14 ENCOUNTER — LAB (OUTPATIENT)
Dept: FAMILY MEDICINE CLINIC | Facility: CLINIC | Age: 55
End: 2025-07-14
Payer: COMMERCIAL

## 2025-07-14 ENCOUNTER — OFFICE VISIT (OUTPATIENT)
Dept: FAMILY MEDICINE CLINIC | Facility: CLINIC | Age: 55
End: 2025-07-14
Payer: COMMERCIAL

## 2025-07-14 VITALS
WEIGHT: 147 LBS | OXYGEN SATURATION: 98 % | DIASTOLIC BLOOD PRESSURE: 68 MMHG | BODY MASS INDEX: 25.1 KG/M2 | SYSTOLIC BLOOD PRESSURE: 110 MMHG | TEMPERATURE: 98.9 F | HEIGHT: 64 IN | HEART RATE: 68 BPM

## 2025-07-14 DIAGNOSIS — L65.9 ALOPECIA: ICD-10-CM

## 2025-07-14 DIAGNOSIS — I10 ESSENTIAL HYPERTENSION: ICD-10-CM

## 2025-07-14 DIAGNOSIS — K21.9 GASTROESOPHAGEAL REFLUX DISEASE WITHOUT ESOPHAGITIS: ICD-10-CM

## 2025-07-14 DIAGNOSIS — E78.2 MIXED HYPERLIPIDEMIA: ICD-10-CM

## 2025-07-14 DIAGNOSIS — R00.2 PALPITATIONS: ICD-10-CM

## 2025-07-14 DIAGNOSIS — K44.9 HIATAL HERNIA: Primary | ICD-10-CM

## 2025-07-14 DIAGNOSIS — R39.11 URINARY HESITANCY: ICD-10-CM

## 2025-07-14 DIAGNOSIS — G57.00 PIRIFORMIS SYNDROME, UNSPECIFIED LATERALITY: ICD-10-CM

## 2025-07-14 DIAGNOSIS — Z00.00 ANNUAL PHYSICAL EXAM: ICD-10-CM

## 2025-07-14 PROBLEM — F41.9 ANXIETY DISORDER: Status: ACTIVE | Noted: 2025-07-14

## 2025-07-14 PROBLEM — E80.6 DISORDER OF BILIRUBIN EXCRETION: Status: ACTIVE | Noted: 2025-07-14

## 2025-07-14 PROBLEM — E78.00 PURE HYPERCHOLESTEROLEMIA: Status: ACTIVE | Noted: 2025-07-14

## 2025-07-14 PROBLEM — R74.01 ELEVATION OF LEVEL OF TRANSAMINASE AND LACTIC ACID DEHYDROGENASE (LDH): Status: ACTIVE | Noted: 2025-07-14

## 2025-07-14 PROBLEM — K21.00 GASTRO-ESOPHAGEAL REFLUX DISEASE WITH ESOPHAGITIS: Status: ACTIVE | Noted: 2025-07-14

## 2025-07-14 PROBLEM — R07.89 NON-CARDIAC CHEST PAIN: Status: ACTIVE | Noted: 2025-07-14

## 2025-07-14 PROBLEM — R00.8 OTHER ABNORMALITIES OF HEART BEAT: Status: ACTIVE | Noted: 2025-07-14

## 2025-07-14 PROBLEM — K75.9 HEPATITIS: Status: ACTIVE | Noted: 2025-07-14

## 2025-07-14 PROBLEM — R74.02 ELEVATION OF LEVEL OF TRANSAMINASE AND LACTIC ACID DEHYDROGENASE (LDH): Status: ACTIVE | Noted: 2025-07-14

## 2025-07-14 PROBLEM — R14.0 BLOATING: Status: ACTIVE | Noted: 2025-07-14

## 2025-07-14 PROBLEM — K22.70 BARRETT'S ESOPHAGUS WITHOUT DYSPLASIA: Status: ACTIVE | Noted: 2024-04-26

## 2025-07-14 LAB
ALBUMIN SERPL-MCNC: 4.9 G/DL (ref 3.5–5.2)
ALBUMIN/GLOB SERPL: 1.8 G/DL
ALP SERPL-CCNC: 50 U/L (ref 39–117)
ALT SERPL W P-5'-P-CCNC: 17 U/L (ref 1–41)
ANION GAP SERPL CALCULATED.3IONS-SCNC: 11 MMOL/L (ref 5–15)
AST SERPL-CCNC: 21 U/L (ref 1–40)
BASOPHILS # BLD MANUAL: 0.18 10*3/MM3 (ref 0–0.2)
BASOPHILS NFR BLD MANUAL: 2 % (ref 0–1.5)
BILIRUB BLD-MCNC: NEGATIVE MG/DL
BILIRUB SERPL-MCNC: 0.8 MG/DL (ref 0–1.2)
BUN SERPL-MCNC: 14 MG/DL (ref 6–20)
BUN/CREAT SERPL: 12.1 (ref 7–25)
CALCIUM SPEC-SCNC: 10.1 MG/DL (ref 8.6–10.5)
CHLORIDE SERPL-SCNC: 104 MMOL/L (ref 98–107)
CHOLEST SERPL-MCNC: 172 MG/DL (ref 0–200)
CLARITY, POC: CLEAR
CO2 SERPL-SCNC: 24 MMOL/L (ref 22–29)
COLOR UR: YELLOW
CREAT SERPL-MCNC: 1.16 MG/DL (ref 0.76–1.27)
DEPRECATED RDW RBC AUTO: 41.3 FL (ref 37–54)
EGFRCR SERPLBLD CKD-EPI 2021: 75.8 ML/MIN/1.73
EOSINOPHIL # BLD MANUAL: 0.72 10*3/MM3 (ref 0–0.4)
EOSINOPHIL NFR BLD MANUAL: 8 % (ref 0.3–6.2)
ERYTHROCYTE [DISTWIDTH] IN BLOOD BY AUTOMATED COUNT: 12.9 % (ref 12.3–15.4)
EXPIRATION DATE: NORMAL
GLOBULIN UR ELPH-MCNC: 2.8 GM/DL
GLUCOSE SERPL-MCNC: 109 MG/DL (ref 65–99)
GLUCOSE UR STRIP-MCNC: NEGATIVE MG/DL
HBA1C MFR BLD: 5.8 % (ref 4.8–5.6)
HCT VFR BLD AUTO: 47 % (ref 37.5–51)
HDLC SERPL-MCNC: 51 MG/DL (ref 40–60)
HGB BLD-MCNC: 15.8 G/DL (ref 13–17.7)
KETONES UR QL: NEGATIVE
LDLC SERPL CALC-MCNC: 103 MG/DL (ref 0–100)
LDLC/HDLC SERPL: 1.98 {RATIO}
LEUKOCYTE EST, POC: NEGATIVE
LYMPHOCYTES # BLD MANUAL: 1.98 10*3/MM3 (ref 0.7–3.1)
LYMPHOCYTES NFR BLD MANUAL: 8 % (ref 5–12)
Lab: NORMAL
MCH RBC QN AUTO: 29.8 PG (ref 26.6–33)
MCHC RBC AUTO-ENTMCNC: 33.6 G/DL (ref 31.5–35.7)
MCV RBC AUTO: 88.7 FL (ref 79–97)
MONOCYTES # BLD: 0.72 10*3/MM3 (ref 0.1–0.9)
NEUTROPHILS # BLD AUTO: 5.4 10*3/MM3 (ref 1.7–7)
NEUTROPHILS NFR BLD MANUAL: 60 % (ref 42.7–76)
NITRITE UR-MCNC: NEGATIVE MG/ML
PH UR: 7 [PH] (ref 5–8)
PLAT MORPH BLD: NORMAL
PLATELET # BLD AUTO: 276 10*3/MM3 (ref 140–450)
PMV BLD AUTO: 10.8 FL (ref 6–12)
POTASSIUM SERPL-SCNC: 3.8 MMOL/L (ref 3.5–5.2)
PROT SERPL-MCNC: 7.7 G/DL (ref 6–8.5)
PROT UR STRIP-MCNC: NEGATIVE MG/DL
RBC # BLD AUTO: 5.3 10*6/MM3 (ref 4.14–5.8)
RBC # UR STRIP: NEGATIVE /UL
RBC MORPH BLD: NORMAL
SODIUM SERPL-SCNC: 139 MMOL/L (ref 136–145)
SP GR UR: 1 (ref 1–1.03)
TRIGL SERPL-MCNC: 99 MG/DL (ref 0–150)
TSH SERPL DL<=0.05 MIU/L-ACNC: 2.81 UIU/ML (ref 0.27–4.2)
UROBILINOGEN UR QL: NORMAL
VARIANT LYMPHS NFR BLD MANUAL: 22 % (ref 19.6–45.3)
VLDLC SERPL-MCNC: 18 MG/DL (ref 5–40)
WBC MORPH BLD: NORMAL
WBC NRBC COR # BLD AUTO: 9 10*3/MM3 (ref 3.4–10.8)

## 2025-07-14 PROCEDURE — 83036 HEMOGLOBIN GLYCOSYLATED A1C: CPT | Performed by: INTERNAL MEDICINE

## 2025-07-14 PROCEDURE — 81003 URINALYSIS AUTO W/O SCOPE: CPT | Performed by: INTERNAL MEDICINE

## 2025-07-14 PROCEDURE — 85007 BL SMEAR W/DIFF WBC COUNT: CPT | Performed by: INTERNAL MEDICINE

## 2025-07-14 PROCEDURE — 80050 GENERAL HEALTH PANEL: CPT | Performed by: INTERNAL MEDICINE

## 2025-07-14 PROCEDURE — 99213 OFFICE O/P EST LOW 20 MIN: CPT | Performed by: INTERNAL MEDICINE

## 2025-07-14 PROCEDURE — 80061 LIPID PANEL: CPT | Performed by: INTERNAL MEDICINE

## 2025-07-14 NOTE — PROGRESS NOTES
"Chief Complaint  Annual Exam and Establish Care    Subjective        Jessie Aguirre presents to [facility]  History of Present Illness  New to me.Has HH, Gerd, allergies.  He also has some urinary frequency.  He is on finasteride.  He has never had his prostate checked.  Otherwise he is doing well.  He does have some pain with his piriformis.  Unsure if maybe his urinary problem is not coming from his back.  He has some low back pain and sacral pain in the past.  May need imaging at some point in the future may be need a bladder scan with a postvoid residual.  Will see what his lab work comes back.  He does have a colonoscopy scheduled but he is going to reschedule that for later date.  Objective   Vital Signs:  /68 (BP Location: Right arm, Patient Position: Sitting, Cuff Size: Adult)   Pulse 68   Temp 98.9 °F (37.2 °C) (Infrared)   Ht 162.6 cm (64.02\")   Wt 66.7 kg (147 lb)   SpO2 98%   BMI 25.22 kg/m²   Estimated body mass index is 25.22 kg/m² as calculated from the following:    Height as of this encounter: 162.6 cm (64.02\").    Weight as of this encounter: 66.7 kg (147 lb).          Review of Systems   Gastrointestinal:         Gerd   Genitourinary:  Positive for difficulty urinating and frequency.   All other systems reviewed and are negative.       Physical Exam  Vitals and nursing note reviewed.   Constitutional:       Appearance: Normal appearance.   HENT:      Head: Normocephalic.      Right Ear: External ear normal.      Left Ear: External ear normal.      Nose: Nose normal.      Mouth/Throat:      Mouth: Mucous membranes are moist.      Pharynx: Oropharynx is clear.   Eyes:      Extraocular Movements: Extraocular movements intact.      Conjunctiva/sclera: Conjunctivae normal.      Pupils: Pupils are equal, round, and reactive to light.   Cardiovascular:      Rate and Rhythm: Regular rhythm.      Heart sounds: Normal heart sounds.   Pulmonary:      Effort: " Pulmonary effort is normal.      Breath sounds: Normal breath sounds.   Abdominal:      Palpations: Abdomen is soft.      Tenderness: There is no abdominal tenderness.   Musculoskeletal:         General: No swelling or tenderness. Normal range of motion.      Cervical back: Neck supple.   Skin:     General: Skin is warm and dry.   Neurological:      General: No focal deficit present.      Mental Status: He is alert.   Psychiatric:         Mood and Affect: Mood normal.         Behavior: Behavior normal.        Result Review :      Data reviewed : prev records        Has colonoscopy scheduled.   Assessment and Plan   Diagnoses and all orders for this visit:    1. Hiatal hernia (Primary)    2. Gastroesophageal reflux disease without esophagitis    3. Mixed hyperlipidemia  -     Lipid Panel    4. Palpitations    5. Essential hypertension    6. Annual physical exam  -     CBC With Manual Differential  -     Comprehensive Metabolic Panel  -     TSH  -     Hemoglobin A1c    7. Urinary hesitancy    8. Alopecia    9. Piriformis syndrome, unspecified laterality      Showed piriformis stretch.  Will call results of his test.  If he needs further care we will notify him.  He has colonoscopy scheduled in the next couple months.  We will have his screening done for that time.  I have him back in 3 months for recheck.       Follow Up   No follow-ups on file.  Patient was given instructions and counseling regarding his condition or for health maintenance advice. Please see specific information pulled into the AVS if appropriate.            26-Aug-2024

## 2025-07-16 NOTE — PROGRESS NOTES
Patient's complete blood count showed increased eosinophils which goes along with allergy patient's glucose was 109 on his chemistry otherwise normal patient's LDL was 103 mildly elevated hemoglobin A1c was 5.8 mildly elevated prediabetic phase.  TSH thyroid function normal